# Patient Record
Sex: FEMALE | Race: OTHER | Employment: FULL TIME | URBAN - METROPOLITAN AREA
[De-identification: names, ages, dates, MRNs, and addresses within clinical notes are randomized per-mention and may not be internally consistent; named-entity substitution may affect disease eponyms.]

---

## 2017-03-07 ENCOUNTER — HOSPITAL ENCOUNTER (OUTPATIENT)
Dept: MAMMOGRAPHY | Age: 52
Discharge: HOME | End: 2017-03-07
Attending: OBSTETRICS & GYNECOLOGY
Payer: COMMERCIAL

## 2017-03-07 DIAGNOSIS — Z12.39 SCREENING FOR BREAST CANCER: ICD-10-CM

## 2017-03-07 PROCEDURE — 77067 SCR MAMMO BI INCL CAD: CPT

## 2018-03-20 ENCOUNTER — HOSPITAL ENCOUNTER (OUTPATIENT)
Dept: MAMMOGRAPHY | Age: 53
Discharge: HOME | End: 2018-03-20
Attending: OBSTETRICS & GYNECOLOGY
Payer: COMMERCIAL

## 2018-03-20 DIAGNOSIS — Z12.39 SCREENING FOR BREAST CANCER: ICD-10-CM

## 2018-03-20 PROCEDURE — 77067 SCR MAMMO BI INCL CAD: CPT

## 2019-03-23 ENCOUNTER — HOSPITAL ENCOUNTER (OUTPATIENT)
Dept: MAMMOGRAPHY | Age: 54
Discharge: HOME | End: 2019-03-23
Attending: OBSTETRICS & GYNECOLOGY
Payer: COMMERCIAL

## 2019-03-23 DIAGNOSIS — Z12.39 SCREENING FOR BREAST CANCER: ICD-10-CM

## 2019-03-23 PROCEDURE — 77067 SCR MAMMO BI INCL CAD: CPT

## 2021-09-11 ENCOUNTER — HOSPITAL ENCOUNTER (OUTPATIENT)
Dept: MAMMOGRAPHY | Age: 56
Discharge: HOME | End: 2021-09-11
Attending: OBSTETRICS & GYNECOLOGY
Payer: COMMERCIAL

## 2021-09-11 DIAGNOSIS — Z12.31 ENCOUNTER FOR SCREENING MAMMOGRAM FOR MALIGNANT NEOPLASM OF BREAST: ICD-10-CM

## 2021-09-11 DIAGNOSIS — R92.2 BREAST DENSITY: ICD-10-CM

## 2021-09-11 PROCEDURE — 77063 BREAST TOMOSYNTHESIS BI: CPT

## 2022-05-12 DIAGNOSIS — E55.9 VITAMIN D DEFICIENCY: ICD-10-CM

## 2022-05-12 DIAGNOSIS — Z00.00 ROUTINE GENERAL MEDICAL EXAMINATION AT A HEALTH CARE FACILITY: ICD-10-CM

## 2022-05-12 DIAGNOSIS — Z00.00 ROUTINE GENERAL MEDICAL EXAMINATION AT A HEALTH CARE FACILITY: Primary | ICD-10-CM

## 2022-05-12 DIAGNOSIS — E78.5 HYPERLIPIDEMIA LDL GOAL <100: Primary | ICD-10-CM

## 2022-06-27 DIAGNOSIS — E55.9 VITAMIN D DEFICIENCY: ICD-10-CM

## 2022-06-27 DIAGNOSIS — E78.5 HYPERLIPIDEMIA LDL GOAL <100: ICD-10-CM

## 2022-06-27 DIAGNOSIS — Z00.00 ROUTINE GENERAL MEDICAL EXAMINATION AT A HEALTH CARE FACILITY: ICD-10-CM

## 2022-06-27 LAB
25(OH)D3 SERPL-MCNC: 42.9 NG/ML (ref 30–100)
ALBUMIN SERPL-MCNC: 4 G/DL (ref 3.5–5)
ALBUMIN/GLOB SERPL: 1.1 {RATIO} (ref 1.2–3.5)
ALP SERPL-CCNC: 88 U/L (ref 50–136)
ALT SERPL-CCNC: 13 U/L (ref 12–65)
ANION GAP SERPL CALC-SCNC: 7 MMOL/L (ref 7–16)
APPEARANCE UR: CLEAR
AST SERPL-CCNC: 22 U/L (ref 15–37)
BASOPHILS # BLD: 0 K/UL (ref 0–0.2)
BASOPHILS NFR BLD: 1 % (ref 0–2)
BILIRUB SERPL-MCNC: 0.5 MG/DL (ref 0.2–1.1)
BILIRUB UR QL: NEGATIVE
BUN SERPL-MCNC: 10 MG/DL (ref 6–23)
CALCIUM SERPL-MCNC: 9.3 MG/DL (ref 8.3–10.4)
CHLORIDE SERPL-SCNC: 106 MMOL/L (ref 98–107)
CHOLEST SERPL-MCNC: 229 MG/DL
CO2 SERPL-SCNC: 31 MMOL/L (ref 21–32)
COLOR UR: NORMAL
CREAT SERPL-MCNC: 0.7 MG/DL (ref 0.6–1)
DIFFERENTIAL METHOD BLD: ABNORMAL
EOSINOPHIL # BLD: 0.1 K/UL (ref 0–0.8)
EOSINOPHIL NFR BLD: 3 % (ref 0.5–7.8)
ERYTHROCYTE [DISTWIDTH] IN BLOOD BY AUTOMATED COUNT: 12.4 % (ref 11.9–14.6)
GLOBULIN SER CALC-MCNC: 3.5 G/DL (ref 2.3–3.5)
GLUCOSE SERPL-MCNC: 71 MG/DL (ref 65–100)
GLUCOSE UR STRIP.AUTO-MCNC: NEGATIVE MG/DL
HCT VFR BLD AUTO: 40.3 % (ref 35.8–46.3)
HDLC SERPL-MCNC: 80 MG/DL (ref 40–60)
HDLC SERPL: 2.9 {RATIO}
HGB BLD-MCNC: 13.1 G/DL (ref 11.7–15.4)
HGB UR QL STRIP: NEGATIVE
IMM GRANULOCYTES # BLD AUTO: 0 K/UL (ref 0–0.5)
IMM GRANULOCYTES NFR BLD AUTO: 0 % (ref 0–5)
KETONES UR QL STRIP.AUTO: NEGATIVE MG/DL
LDLC SERPL CALC-MCNC: 125 MG/DL
LEUKOCYTE ESTERASE UR QL STRIP.AUTO: NEGATIVE
LYMPHOCYTES # BLD: 1.7 K/UL (ref 0.5–4.6)
LYMPHOCYTES NFR BLD: 40 % (ref 13–44)
MCH RBC QN AUTO: 30 PG (ref 26.1–32.9)
MCHC RBC AUTO-ENTMCNC: 32.5 G/DL (ref 31.4–35)
MCV RBC AUTO: 92.4 FL (ref 79.6–97.8)
MONOCYTES # BLD: 0.4 K/UL (ref 0.1–1.3)
MONOCYTES NFR BLD: 9 % (ref 4–12)
NEUTS SEG # BLD: 2 K/UL (ref 1.7–8.2)
NEUTS SEG NFR BLD: 47 % (ref 43–78)
NITRITE UR QL STRIP.AUTO: NEGATIVE
NRBC # BLD: 0 K/UL (ref 0–0.2)
PH UR STRIP: 5 [PH] (ref 5–9)
PLATELET # BLD AUTO: 157 K/UL (ref 150–450)
PMV BLD AUTO: 12.4 FL (ref 9.4–12.3)
POTASSIUM SERPL-SCNC: 3.9 MMOL/L (ref 3.5–5.1)
PROT SERPL-MCNC: 7.5 G/DL (ref 6.3–8.2)
PROT UR STRIP-MCNC: NEGATIVE MG/DL
RBC # BLD AUTO: 4.36 M/UL (ref 4.05–5.2)
SODIUM SERPL-SCNC: 144 MMOL/L (ref 136–145)
SP GR UR REFRACTOMETRY: 1.02 (ref 1–1.02)
TRIGL SERPL-MCNC: 120 MG/DL (ref 35–150)
TSH, 3RD GENERATION: 1.62 UIU/ML (ref 0.36–3.74)
UROBILINOGEN UR QL STRIP.AUTO: 0.2 EU/DL (ref 0.2–1)
VLDLC SERPL CALC-MCNC: 24 MG/DL (ref 6–23)
WBC # BLD AUTO: 4.2 K/UL (ref 4.3–11.1)

## 2022-07-06 ENCOUNTER — OFFICE VISIT (OUTPATIENT)
Dept: FAMILY MEDICINE CLINIC | Facility: CLINIC | Age: 57
End: 2022-07-06
Payer: COMMERCIAL

## 2022-07-06 VITALS
SYSTOLIC BLOOD PRESSURE: 120 MMHG | HEIGHT: 65 IN | BODY MASS INDEX: 20.99 KG/M2 | WEIGHT: 126 LBS | DIASTOLIC BLOOD PRESSURE: 80 MMHG

## 2022-07-06 DIAGNOSIS — Z00.00 ROUTINE GENERAL MEDICAL EXAMINATION AT A HEALTH CARE FACILITY: Primary | ICD-10-CM

## 2022-07-06 DIAGNOSIS — H81.13 BENIGN PAROXYSMAL POSITIONAL VERTIGO DUE TO BILATERAL VESTIBULAR DISORDER: ICD-10-CM

## 2022-07-06 DIAGNOSIS — Z12.11 SPECIAL SCREENING FOR MALIGNANT NEOPLASMS, COLON: ICD-10-CM

## 2022-07-06 DIAGNOSIS — E78.5 HYPERLIPIDEMIA LDL GOAL <100: ICD-10-CM

## 2022-07-06 DIAGNOSIS — Z13.31 SCREENING FOR DEPRESSION: ICD-10-CM

## 2022-07-06 DIAGNOSIS — I10 ESSENTIAL HYPERTENSION: ICD-10-CM

## 2022-07-06 DIAGNOSIS — E55.9 VITAMIN D DEFICIENCY: ICD-10-CM

## 2022-07-06 DIAGNOSIS — L30.9 DERMATITIS: ICD-10-CM

## 2022-07-06 PROCEDURE — 99396 PREV VISIT EST AGE 40-64: CPT | Performed by: FAMILY MEDICINE

## 2022-07-06 RX ORDER — MUPIROCIN CALCIUM 20 MG/G
CREAM TOPICAL 3 TIMES DAILY
COMMUNITY
End: 2022-07-06

## 2022-07-06 RX ORDER — MUPIROCIN CALCIUM 20 MG/G
CREAM TOPICAL
Qty: 30 G | Refills: 3 | Status: SHIPPED | OUTPATIENT
Start: 2022-07-06 | End: 2022-08-05

## 2022-07-06 RX ORDER — VALSARTAN AND HYDROCHLOROTHIAZIDE 80; 12.5 MG/1; MG/1
1 TABLET, FILM COATED ORAL DAILY
COMMUNITY
Start: 2022-05-12 | End: 2022-07-06 | Stop reason: SDUPTHER

## 2022-07-06 RX ORDER — MECLIZINE HYDROCHLORIDE 25 MG/1
25 TABLET ORAL 3 TIMES DAILY PRN
Qty: 90 TABLET | Refills: 1 | Status: SHIPPED | OUTPATIENT
Start: 2022-07-06

## 2022-07-06 RX ORDER — VALSARTAN AND HYDROCHLOROTHIAZIDE 80; 12.5 MG/1; MG/1
1 TABLET, FILM COATED ORAL DAILY
Qty: 90 TABLET | Refills: 3 | Status: SHIPPED | OUTPATIENT
Start: 2022-07-06

## 2022-07-06 ASSESSMENT — ENCOUNTER SYMPTOMS
SINUS PRESSURE: 0
NAUSEA: 1
ABDOMINAL PAIN: 0
SHORTNESS OF BREATH: 0
VOMITING: 0
COUGH: 0
SINUS PAIN: 0

## 2022-07-06 ASSESSMENT — PATIENT HEALTH QUESTIONNAIRE - PHQ9
SUM OF ALL RESPONSES TO PHQ9 QUESTIONS 1 & 2: 0
SUM OF ALL RESPONSES TO PHQ QUESTIONS 1-9: 0
1. LITTLE INTEREST OR PLEASURE IN DOING THINGS: 0
SUM OF ALL RESPONSES TO PHQ QUESTIONS 1-9: 0
SUM OF ALL RESPONSES TO PHQ QUESTIONS 1-9: 0
2. FEELING DOWN, DEPRESSED OR HOPELESS: 0
SUM OF ALL RESPONSES TO PHQ QUESTIONS 1-9: 0

## 2022-07-06 NOTE — PROGRESS NOTES
of Systems   Constitutional: Negative for chills, fatigue and fever. HENT: Negative for sinus pressure, sinus pain and tinnitus. Eyes: Negative for visual disturbance. Respiratory: Negative for cough and shortness of breath. Cardiovascular: Negative for chest pain. Gastrointestinal: Positive for nausea. Negative for abdominal pain and vomiting. Endocrine: Negative for cold intolerance and heat intolerance. Genitourinary: Negative for difficulty urinating, frequency, hematuria, pelvic pain, vaginal bleeding, vaginal discharge and vaginal pain. Neurological: Positive for dizziness. Negative for syncope, weakness, light-headedness, numbness and headaches. Psychiatric/Behavioral: Negative. OBJECTIVE:  /80 (Site: Left Upper Arm, Position: Sitting, Cuff Size: Small Adult)   Ht 5' 5\" (1.651 m)   Wt 126 lb (57.2 kg)   BMI 20.97 kg/m²      Physical Exam  Vitals and nursing note reviewed. Constitutional:       Appearance: Normal appearance. HENT:      Head: Normocephalic and atraumatic. Nose: Nose normal.      Mouth/Throat:      Mouth: Mucous membranes are moist.      Pharynx: No oropharyngeal exudate or posterior oropharyngeal erythema. Eyes:      Extraocular Movements: Extraocular movements intact. Conjunctiva/sclera: Conjunctivae normal.      Pupils: Pupils are equal, round, and reactive to light. Cardiovascular:      Rate and Rhythm: Normal rate and regular rhythm. Pulses: Normal pulses. Heart sounds: Normal heart sounds. Pulmonary:      Effort: Pulmonary effort is normal.      Breath sounds: Normal breath sounds. Abdominal:      General: Abdomen is flat. Bowel sounds are normal.      Palpations: Abdomen is soft. Genitourinary:     Comments: Deferred done by OB/GYN  Musculoskeletal:         General: Normal range of motion. Cervical back: Normal range of motion and neck supple. Skin:     General: Skin is warm.       Capillary Refill: Capillary ml/min/1.73m2    Calcium 9.3 8.3 - 10.4 MG/DL    Total Bilirubin 0.5 0.2 - 1.1 MG/DL    ALT 13 12 - 65 U/L    AST 22 15 - 37 U/L    Alk Phosphatase 88 50 - 136 U/L    Total Protein 7.5 6.3 - 8.2 g/dL    Albumin 4.0 3.5 - 5.0 g/dL    Globulin 3.5 2.3 - 3.5 g/dL    Albumin/Globulin Ratio 1.1 (L) 1.2 - 3.5     CBC with Auto Differential    Collection Time: 06/27/22  8:24 AM   Result Value Ref Range    WBC 4.2 (L) 4.3 - 11.1 K/uL    RBC 4.36 4.05 - 5.2 M/uL    Hemoglobin 13.1 11.7 - 15.4 g/dL    Hematocrit 40.3 35.8 - 46.3 %    MCV 92.4 79.6 - 97.8 FL    MCH 30.0 26.1 - 32.9 PG    MCHC 32.5 31.4 - 35.0 g/dL    RDW 12.4 11.9 - 14.6 %    Platelets 790 037 - 102 K/uL    MPV 12.4 (H) 9.4 - 12.3 FL    nRBC 0.00 0.0 - 0.2 K/uL    Differential Type AUTOMATED      Seg Neutrophils 47 43 - 78 %    Lymphocytes 40 13 - 44 %    Monocytes 9 4.0 - 12.0 %    Eosinophils % 3 0.5 - 7.8 %    Basophils 1 0.0 - 2.0 %    Immature Granulocytes 0 0.0 - 5.0 %    Segs Absolute 2.0 1.7 - 8.2 K/UL    Absolute Lymph # 1.7 0.5 - 4.6 K/UL    Absolute Mono # 0.4 0.1 - 1.3 K/UL    Absolute Eos # 0.1 0.0 - 0.8 K/UL    Basophils Absolute 0.0 0.0 - 0.2 K/UL    Absolute Immature Granulocyte 0.0 0.0 - 0.5 K/UL       ASSESSMENT and PLAN    Visit Diagnoses and Associated Orders     Routine general medical examination at a health care facility    -  Primary    HIV 1/2 Ag/Ab, 4TH Generation,W Rflx Confirm [95677 CPT(R)]   - Future Order    CBC with Auto Differential [[id number] Custom]   - Future Order    Comprehensive Metabolic Panel [[id number] Custom]   - Future Order    Lipid Panel [[id number] Custom]   - Future Order    Vitamin D 25 Hydroxy [[id number] Custom]   - Future Order    TSH [[id number] Custom]   - Future Order    Hepatitis C Antibody [[id number] Custom]   - Future Order    Urinalysis [[id number] Custom]   - Future Order         Dermatitis        mupirocin (BACTROBAN) 2 % cream [[id number]]           Essential hypertension        valsartan-hydroCHLOROthiazide (DIOVAN-HCT) 80-12.5 MG per tablet [[id number]]           Vitamin D deficiency             Hyperlipidemia LDL goal <100        Lipid Panel [[id number] Custom]   - Future Order         Screening for depression             Special screening for malignant neoplasms, colon        [facility] - Colonoscopy [[id number] Custom]           ORDERS WITHOUT AN ASSOCIATED DIAGNOSIS    vitamin D (CHOLECALCIFEROL) 25 MCG (1000 UT) TABS tablet [[id number]]               Kelsea was seen today for annual exam, discuss labs and dizziness. Diagnoses and all orders for this visit:    Routine general medical examination at a health care facility  -     HIV 1/2 Ag/Ab, 4TH Generation,W Rflx Confirm; Future  -     CBC with Auto Differential; Future  -     Comprehensive Metabolic Panel; Future  -     Lipid Panel; Future  -     Vitamin D 25 Hydroxy; Future  -     TSH; Future  -     Hepatitis C Antibody; Future  -     Urinalysis; Future    Dermatitis  -     mupirocin (BACTROBAN) 2 % cream; Apply 3 times daily. Essential hypertension  -     valsartan-hydroCHLOROthiazide (DIOVAN-HCT) 80-12.5 MG per tablet; Take 1 tablet by mouth daily    Vitamin D deficiency    Hyperlipidemia LDL goal <100  -     Cancel: Lipid Panel; Future  -     Lipid Panel;  Future    Screening for depression    Special screening for malignant neoplasms, colon  -     [facility] [place] - Colonoscopy

## 2022-11-28 ENCOUNTER — OFFICE VISIT (OUTPATIENT)
Dept: OBGYN CLINIC | Age: 57
End: 2022-11-28
Payer: COMMERCIAL

## 2022-11-28 VITALS — DIASTOLIC BLOOD PRESSURE: 80 MMHG | SYSTOLIC BLOOD PRESSURE: 122 MMHG

## 2022-11-28 DIAGNOSIS — E78.5 HYPERLIPIDEMIA LDL GOAL <100: ICD-10-CM

## 2022-11-28 DIAGNOSIS — Z12.31 ENCOUNTER FOR SCREENING MAMMOGRAM FOR MALIGNANT NEOPLASM OF BREAST: ICD-10-CM

## 2022-11-28 DIAGNOSIS — N95.2 POST-MENOPAUSAL ATROPHIC VAGINITIS: ICD-10-CM

## 2022-11-28 DIAGNOSIS — Z12.4 SCREENING FOR CERVICAL CANCER: ICD-10-CM

## 2022-11-28 DIAGNOSIS — Z01.419 WELL WOMAN EXAM WITH ROUTINE GYNECOLOGICAL EXAM: Primary | ICD-10-CM

## 2022-11-28 DIAGNOSIS — Z12.11 ENCOUNTER FOR SCREENING FECAL OCCULT BLOOD TESTING: ICD-10-CM

## 2022-11-28 LAB
CHOLEST SERPL-MCNC: 255 MG/DL
HDLC SERPL-MCNC: 78 MG/DL (ref 40–60)
HDLC SERPL: 3.3 {RATIO}
LDLC SERPL CALC-MCNC: 142.6 MG/DL
OCCULT BLOOD, OTHER: NEGATIVE
TRIGL SERPL-MCNC: 172 MG/DL (ref 35–150)
VALID INTERNAL CONTROL, POC: YES
VLDLC SERPL CALC-MCNC: 34.4 MG/DL (ref 6–23)

## 2022-11-28 PROCEDURE — 99396 PREV VISIT EST AGE 40-64: CPT | Performed by: OBSTETRICS & GYNECOLOGY

## 2022-11-28 PROCEDURE — 82271 OCCULT BLOOD OTHER SOURCES: CPT | Performed by: OBSTETRICS & GYNECOLOGY

## 2022-11-28 RX ORDER — ESTRADIOL 10 UG/1
10 INSERT VAGINAL
Qty: 36 TABLET | Refills: 4 | Status: SHIPPED | OUTPATIENT
Start: 2022-11-28

## 2022-11-28 ASSESSMENT — ENCOUNTER SYMPTOMS
ALLERGIC/IMMUNOLOGIC NEGATIVE: 1
SHORTNESS OF BREATH: 0
BLOOD IN STOOL: 0
COUGH: 0
EYES NEGATIVE: 1
RESPIRATORY NEGATIVE: 1
ABDOMINAL PAIN: 0
GASTROINTESTINAL NEGATIVE: 1

## 2022-11-28 NOTE — PROGRESS NOTES
Asa Montes is 62 y.o. female, Trevor Morris, who presents today for a routine annual gynecological examination. No LMP recorded. Patient is postmenopausal. . Has recently  about a year ago. Started dating recently, tried to have intercourse but is unable to d/t discomfort. Ow Doing well. No Gyn, Breast, G/U, or GI complaints. Has been off HRT a couple of years and doing well. Mammogram/Pap Hx 2022   Mammogram Date 2022   Mammogram Result Benign   Pap Date 2021   Pap Result Negative     GYN Intake Questionnaire 2022   Current Form of Contraception None   Dyspareunia None   Post-Coital Bleeding None   Date of Mammogram 2022   Result of Mammogram Benign   Date of Pap 2021   Pap result Negative       Contraception: postmenopausal  Has received Gardisil: UNKNOWN  Last [place] 7 years ago  Last time cholesterol was checked: had labs today with PCP    OB History:   OB History    Para Term  AB Living   1       1     SAB IAB Ectopic Molar Multiple Live Births                    # Outcome Date GA Lbr Saqib/2nd Weight Sex Delivery Anes PTL Lv   1 AB         DEC         Health Maintenance Due   Topic Date Due    COVID-19 Vaccine (1) Never done    HIV screen  Never done    DTaP/Tdap/Td vaccine (1 - Tdap) Never done    Colorectal Cancer Screen  Never done    Shingles vaccine (1 of 2) Never done    Flu vaccine (1) Never done         GYN History            Gbiran Davey  has a past medical history of Collapse of right lung, Hyperlipidemia LDL goal <100, and Vitamin D deficiency. .    Her surgeries include  has a past surgical history that includes Colonoscopy (2016) and implant breast silicone/eq (Bilateral, ). .    No Known Allergies     Her current meds are:   Current Outpatient Medications   Medication Sig    Estradiol (VAGIFEM) 10 MCG TABS vaginal tablet Place 1 tablet vaginally three times a week Use daily the first two weeks    vitamin D (CHOLECALCIFEROL) 25 MCG (1000 UT) TABS tablet Take 1,000 Units by mouth daily    valsartan-hydroCHLOROthiazide (DIOVAN-HCT) 80-12.5 MG per tablet Take 1 tablet by mouth daily    meclizine (ANTIVERT) 25 MG tablet Take 1 tablet by mouth 3 times daily as needed for Dizziness 1 p.o. at bedtime with vertigo exercises     No current facility-administered medications for this visit. Family history is significant for family history includes Cancer in her father; Diabetes in her maternal aunt and mother; Osteoporosis in her mother; Stroke in her mother. Giles Bonner  reports that she quit smoking about 21 years ago. Her smoking use included cigarettes. She has never used smokeless tobacco. She reports current alcohol use. She reports that she does not use drugs. She completed her Systems Review which is documented below. Any positive systems not related to Gyn are recommended to discuss with her PCP. Review of Systems   Constitutional: Negative. Negative for unexpected weight change. HENT: Negative. Eyes: Negative. Respiratory: Negative. Negative for cough and shortness of breath. Cardiovascular: Negative. Negative for chest pain and palpitations. Gastrointestinal: Negative. Negative for abdominal pain and blood in stool. Endocrine: Negative. Genitourinary: Negative. Negative for difficulty urinating, dysuria, menstrual problem, pelvic pain and urgency. Musculoskeletal: Negative. Skin: Negative. Allergic/Immunologic: Negative. Neurological: Negative. Hematological: Negative. Psychiatric/Behavioral: Negative. Negative for behavioral problems and confusion. All other systems reviewed and are negative. No results found for this visit on 11/28/22. Blood pressure 122/80. There is no height or weight on file to calculate BMI. Wt Readings from Last 3 Encounters:   07/06/22 126 lb (57.2 kg)   05/12/22 126 lb (57.2 kg)   08/20/21 137 lb (62.1 kg)      Physical Exam  Vitals reviewed.  Exam conducted with a chaperone present. Constitutional:       General: She is not in acute distress. Appearance: Normal appearance. HENT:      Head: Normocephalic and atraumatic. Pulmonary:      Effort: Pulmonary effort is normal. No respiratory distress. Chest:   Breasts:     Breasts are symmetrical.      Right: Normal. No bleeding, inverted nipple, mass, nipple discharge or skin change. Left: Normal. No bleeding, inverted nipple, mass, nipple discharge or skin change. Abdominal:      General: Abdomen is flat. There is no distension. Palpations: There is no mass. Tenderness: There is no abdominal tenderness. There is no guarding or rebound. Hernia: No hernia is present. There is no hernia in the left inguinal area or right inguinal area. Genitourinary:     General: Normal vulva. Exam position: Lithotomy position. Labia:         Right: No rash, tenderness, lesion or injury. Left: No rash, tenderness, lesion or injury. Vagina: Normal.      Cervix: Normal.      Uterus: Normal.       Adnexa: Right adnexa normal and left adnexa normal.        Right: No tenderness. Left: No tenderness. Rectum: Normal. Guaiac result negative. Comments: Vaginal caliber has narrowed, and changes cw atrophic vaginitis are present  Musculoskeletal:         General: Normal range of motion. Cervical back: Normal range of motion and neck supple. Lymphadenopathy:      Upper Body:      Right upper body: No axillary adenopathy. Left upper body: No axillary adenopathy. Skin:     General: Skin is warm and dry. Neurological:      General: No focal deficit present. Mental Status: She is alert and oriented to person, place, and time. Psychiatric:         Mood and Affect: Mood normal.         Behavior: Behavior normal.         Thought Content:  Thought content normal.         Judgment: Judgment normal.        Assessment/plan: Eliezer Esparza was seen today for Annual Exam       Federico Helms was seen today for annual exam.    Diagnoses and all orders for this visit:    Well woman exam with routine gynecological exam  -     PAP LB, Reflex HPV ASCUS; Future  -     ANNA JEANNIE DIGITAL SCREEN BILATERAL; Future  -     AMB POC OCCULT, OTHER SOURCE    Screening for cervical cancer  -     PAP LB, Reflex HPV ASCUS; Future    Encounter for screening mammogram for malignant neoplasm of breast  -     ANNA JEANNIE DIGITAL SCREEN BILATERAL; Future    Encounter for screening fecal occult blood testing  -     AMB POC OCCULT, OTHER SOURCE    Post-menopausal atrophic vaginitis - physiology reviewed, tx reviewed rec vagifem, would like to try.   Instruction given  -     Estradiol (VAGIFEM) 10 MCG TABS vaginal tablet; Place 1 tablet vaginally three times a week Use daily the first two weeks       Return in about 1 year (around 11/28/2023) for Annual.

## 2022-12-01 ENCOUNTER — OFFICE VISIT (OUTPATIENT)
Dept: FAMILY MEDICINE CLINIC | Facility: CLINIC | Age: 57
End: 2022-12-01
Payer: COMMERCIAL

## 2022-12-01 VITALS
BODY MASS INDEX: 22.16 KG/M2 | WEIGHT: 133 LBS | HEIGHT: 65 IN | SYSTOLIC BLOOD PRESSURE: 110 MMHG | DIASTOLIC BLOOD PRESSURE: 70 MMHG

## 2022-12-01 DIAGNOSIS — E78.5 HYPERLIPIDEMIA LDL GOAL <100: Primary | ICD-10-CM

## 2022-12-01 LAB
CYTOLOGIST CVX/VAG CYTO: NORMAL
CYTOLOGY CVX/VAG DOC THIN PREP: NORMAL
HPV REFLEX: NORMAL
Lab: NORMAL
Lab: NORMAL
PATH REPORT.FINAL DX SPEC: NORMAL
STAT OF ADQ CVX/VAG CYTO-IMP: NORMAL

## 2022-12-01 PROCEDURE — 99213 OFFICE O/P EST LOW 20 MIN: CPT | Performed by: FAMILY MEDICINE

## 2022-12-01 RX ORDER — ATORVASTATIN CALCIUM 20 MG/1
20 TABLET, FILM COATED ORAL NIGHTLY
Qty: 90 TABLET | Refills: 3 | Status: SHIPPED | OUTPATIENT
Start: 2022-12-01

## 2022-12-01 SDOH — ECONOMIC STABILITY: FOOD INSECURITY: WITHIN THE PAST 12 MONTHS, YOU WORRIED THAT YOUR FOOD WOULD RUN OUT BEFORE YOU GOT MONEY TO BUY MORE.: NEVER TRUE

## 2022-12-01 SDOH — ECONOMIC STABILITY: FOOD INSECURITY: WITHIN THE PAST 12 MONTHS, THE FOOD YOU BOUGHT JUST DIDN'T LAST AND YOU DIDN'T HAVE MONEY TO GET MORE.: NEVER TRUE

## 2022-12-01 ASSESSMENT — ENCOUNTER SYMPTOMS
SHORTNESS OF BREATH: 0
VOMITING: 0
NAUSEA: 0

## 2022-12-01 ASSESSMENT — PATIENT HEALTH QUESTIONNAIRE - PHQ9
1. LITTLE INTEREST OR PLEASURE IN DOING THINGS: 0
SUM OF ALL RESPONSES TO PHQ QUESTIONS 1-9: 0
SUM OF ALL RESPONSES TO PHQ9 QUESTIONS 1 & 2: 0
2. FEELING DOWN, DEPRESSED OR HOPELESS: 0

## 2022-12-01 ASSESSMENT — SOCIAL DETERMINANTS OF HEALTH (SDOH): HOW HARD IS IT FOR YOU TO PAY FOR THE VERY BASICS LIKE FOOD, HOUSING, MEDICAL CARE, AND HEATING?: NOT HARD AT ALL

## 2022-12-01 NOTE — PROGRESS NOTES
PROGRESS NOTE    SUBJECTIVE:   Dona Mendosa is a 62 y.o. female seen for a follow up visit regarding the following chief complaint:     Chief Complaint   Patient presents with    Cholesterol Problem     Follow up    Discuss Labs           HPI patient presents office today follow-up on lab work without any new complaints      Past Medical History, Past Surgical History, Family history, Social History, and Medications were all reviewed with the patient today and updated as necessary. Current Outpatient Medications   Medication Sig Dispense Refill    atorvastatin (LIPITOR) 20 MG tablet Take 1 tablet by mouth at bedtime 90 tablet 3    Estradiol (VAGIFEM) 10 MCG TABS vaginal tablet Place 1 tablet vaginally three times a week Use daily the first two weeks 36 tablet 4    vitamin D (CHOLECALCIFEROL) 25 MCG (1000 UT) TABS tablet Take 1,000 Units by mouth daily      valsartan-hydroCHLOROthiazide (DIOVAN-HCT) 80-12.5 MG per tablet Take 1 tablet by mouth daily 90 tablet 3     No current facility-administered medications for this visit.      No Known Allergies  Patient Active Problem List   Diagnosis    Hyperlipidemia LDL goal <100    Pneumothorax on right    Perimenopausal    Vitamin D deficiency     Past Medical History:   Diagnosis Date    Collapse of right lung 09/11/2016    Hyperlipidemia LDL goal <100 7/22/2016    Vitamin D deficiency 05/09/2014    [facility] (Level 26)     Past Surgical History:   Procedure Laterality Date    COLONOSCOPY  08/22/2016    Dr. Felicia Grey Bilateral 9839    In front of muscle/within breast tissue/ replaced 2019     Family History   Problem Relation Age of Onset    Diabetes Mother     Osteoporosis Mother     Stroke Mother     Diabetes Maternal Aunt     Cancer Father         leukemia    Breast Cancer Neg Hx     Colon Cancer Neg Hx     Ovarian Cancer Neg Hx      Social History     Tobacco Use    Smoking status: Former     Packs/day: 1.00     Years: 15.00 Pack years: 15.00     Types: Cigarettes     Start date: 36     Quit date: 2001     Years since quittin.9     Passive exposure: Never    Smokeless tobacco: Never   Substance Use Topics    Alcohol use: Yes         Review of Systems   Constitutional:  Negative for fatigue and fever. Respiratory:  Negative for shortness of breath. Cardiovascular:  Negative for chest pain. Gastrointestinal:  Negative for nausea and vomiting. OBJECTIVE:  /70 (Site: Left Upper Arm, Position: Sitting, Cuff Size: Small Adult)   Ht 5' 5\" (1.651 m)   Wt 133 lb (60.3 kg)   BMI 22.13 kg/m²      Physical Exam  Vitals and nursing note reviewed. Constitutional:       Appearance: Normal appearance. Eyes:      Pupils: Pupils are equal, round, and reactive to light. Cardiovascular:      Rate and Rhythm: Normal rate and regular rhythm. Pulmonary:      Effort: Pulmonary effort is normal.      Breath sounds: Normal breath sounds. Neurological:      Mental Status: She is alert. Psychiatric:         Mood and Affect: Mood normal.         Behavior: Behavior normal.        Medical problems and test results were reviewed with the patient today.      Recent Results (from the past 672 hour(s))   Lipid Panel    Collection Time: 22  8:41 AM   Result Value Ref Range    Cholesterol, Total 255 (H) <200 MG/DL    Triglycerides 172 (H) 35 - 150 MG/DL    HDL 78 (H) 40 - 60 MG/DL    LDL Calculated 142.6 (H) <100 MG/DL    VLDL Cholesterol Calculated 34.4 (H) 6.0 - 23.0 MG/DL    Chol/HDL Ratio 3.3     AMB POC OCCULT, OTHER SOURCE    Collection Time: 22  2:30 PM   Result Value Ref Range    Valid Internal Control, POC Yes     Occult Blood, Other Negative    PAP LB, Reflex HPV ASCUS    Collection Time: 22  2:41 PM   Result Value Ref Range    Diagnosis: Comment      Specimen adequacy: Comment      Performed by: Comment      QC reviewed by: Comment      Note: Comment      Methodology: Comment      HPV Reflex Comment ASSESSMENT and PLAN    Visit Diagnoses and Associated Orders       Hyperlipidemia LDL goal <100    -  Primary    Lipid Panel [[id number] Custom]   - Future Order    Hepatic Function Panel [[id number] Custom]   - Future Order    atorvastatin (LIPITOR) 20 MG tablet [[id number]]                       Diagnosis Orders   1. Hyperlipidemia LDL goal <100  Lipid Panel    Hepatic Function Panel    atorvastatin (LIPITOR) 20 MG tablet      , Kelsea was seen today for cholesterol problem and discuss labs. Diagnoses and all orders for this visit:    Hyperlipidemia LDL goal <100  -     Lipid Panel; Future  -     Hepatic Function Panel; Future  -     atorvastatin (LIPITOR) 20 MG tablet;  Take 1 tablet by mouth at bedtime    , Started on atorvastatin patient is diet and exercise did not work she will continue trying to best she can with diet and exercise we will start her on Lipitor 20 mg and have her return back for hepatic and lipid and we will call her back with results and plan risk benefits and options discussed

## 2022-12-03 ENCOUNTER — HOSPITAL ENCOUNTER (OUTPATIENT)
Dept: MAMMOGRAPHY | Age: 57
End: 2022-12-03
Payer: COMMERCIAL

## 2022-12-03 DIAGNOSIS — Z12.31 ENCOUNTER FOR SCREENING MAMMOGRAM FOR MALIGNANT NEOPLASM OF BREAST: ICD-10-CM

## 2022-12-03 DIAGNOSIS — Z01.419 WELL WOMAN EXAM WITH ROUTINE GYNECOLOGICAL EXAM: ICD-10-CM

## 2022-12-03 PROCEDURE — 77067 SCR MAMMO BI INCL CAD: CPT

## 2022-12-27 ENCOUNTER — CLINICAL DOCUMENTATION (OUTPATIENT)
Dept: SURGERY | Age: 57
End: 2022-12-27

## 2022-12-27 NOTE — PROGRESS NOTES
Pt has no family history of colon cancer  Pt is not taking anticoagulation  8/22/16 hyperplastic polyp colonoscopy Dr. Musa Schneider    I have reviewed the patient's chart and consider the patient an acceptable risk for screening colonoscopy without a formal office visit. We will contact the patient to give the details of the bowel prep and to schedule screening colonoscopy in the near future. Once the colonoscopy has been completed, the Health Maintenance will be updated accordingly.      CELESTE Acosta - NP

## 2022-12-29 ENCOUNTER — PREP FOR PROCEDURE (OUTPATIENT)
Dept: SURGERY | Age: 57
End: 2022-12-29

## 2022-12-29 PROBLEM — Z12.11 SPECIAL SCREENING FOR MALIGNANT NEOPLASMS, COLON: Status: ACTIVE | Noted: 2022-12-29

## 2023-01-05 ENCOUNTER — NURSE ONLY (OUTPATIENT)
Dept: FAMILY MEDICINE CLINIC | Facility: CLINIC | Age: 58
End: 2023-01-05

## 2023-01-05 DIAGNOSIS — E78.5 HYPERLIPIDEMIA LDL GOAL <100: ICD-10-CM

## 2023-01-06 LAB
ALBUMIN SERPL-MCNC: 3.8 G/DL (ref 3.5–5)
ALBUMIN/GLOB SERPL: 1.2 {RATIO} (ref 0.4–1.6)
ALP SERPL-CCNC: 77 U/L (ref 50–136)
ALT SERPL-CCNC: 22 U/L (ref 12–65)
AST SERPL-CCNC: 29 U/L (ref 15–37)
BILIRUB DIRECT SERPL-MCNC: 0.2 MG/DL
BILIRUB SERPL-MCNC: 0.9 MG/DL (ref 0.2–1.1)
CHOLEST SERPL-MCNC: 166 MG/DL
GLOBULIN SER CALC-MCNC: 3.2 G/DL (ref 2.8–4.5)
HDLC SERPL-MCNC: 74 MG/DL (ref 40–60)
HDLC SERPL: 2.2 {RATIO}
LDLC SERPL CALC-MCNC: 69.6 MG/DL
PROT SERPL-MCNC: 7 G/DL (ref 6.3–8.2)
TRIGL SERPL-MCNC: 112 MG/DL (ref 35–150)
VLDLC SERPL CALC-MCNC: 22.4 MG/DL (ref 6–23)

## 2023-01-12 ENCOUNTER — OFFICE VISIT (OUTPATIENT)
Dept: FAMILY MEDICINE CLINIC | Facility: CLINIC | Age: 58
End: 2023-01-12
Payer: COMMERCIAL

## 2023-01-12 VITALS
BODY MASS INDEX: 22.33 KG/M2 | HEIGHT: 65 IN | WEIGHT: 134 LBS | SYSTOLIC BLOOD PRESSURE: 130 MMHG | DIASTOLIC BLOOD PRESSURE: 86 MMHG

## 2023-01-12 DIAGNOSIS — I10 ESSENTIAL HYPERTENSION: ICD-10-CM

## 2023-01-12 DIAGNOSIS — E78.5 HYPERLIPIDEMIA LDL GOAL <100: Primary | ICD-10-CM

## 2023-01-12 PROCEDURE — 3075F SYST BP GE 130 - 139MM HG: CPT | Performed by: FAMILY MEDICINE

## 2023-01-12 PROCEDURE — 3079F DIAST BP 80-89 MM HG: CPT | Performed by: FAMILY MEDICINE

## 2023-01-12 PROCEDURE — 99213 OFFICE O/P EST LOW 20 MIN: CPT | Performed by: FAMILY MEDICINE

## 2023-01-12 ASSESSMENT — PATIENT HEALTH QUESTIONNAIRE - PHQ9
SUM OF ALL RESPONSES TO PHQ QUESTIONS 1-9: 0
SUM OF ALL RESPONSES TO PHQ9 QUESTIONS 1 & 2: 0
SUM OF ALL RESPONSES TO PHQ QUESTIONS 1-9: 0
2. FEELING DOWN, DEPRESSED OR HOPELESS: 0
SUM OF ALL RESPONSES TO PHQ QUESTIONS 1-9: 0
SUM OF ALL RESPONSES TO PHQ QUESTIONS 1-9: 0
1. LITTLE INTEREST OR PLEASURE IN DOING THINGS: 0

## 2023-01-12 ASSESSMENT — ENCOUNTER SYMPTOMS
NAUSEA: 0
VOMITING: 0
SHORTNESS OF BREATH: 0

## 2023-01-12 NOTE — PROGRESS NOTES
PROGRESS NOTE    SUBJECTIVE:   Richelle Leija is a 62 y.o. female seen for a follow up visit regarding the following chief complaint:     Chief Complaint   Patient presents with    Cholesterol Problem     Follow up    Gustavo ROBBISN presents to the office today for follow-up of her lab work without any new complaints states she is feeling fine      Past Medical History, Past Surgical History, Family history, Social History, and Medications were all reviewed with the patient today and updated as necessary. Current Outpatient Medications   Medication Sig Dispense Refill    atorvastatin (LIPITOR) 20 MG tablet Take 1 tablet by mouth at bedtime 90 tablet 3    vitamin D (CHOLECALCIFEROL) 25 MCG (1000 UT) TABS tablet Take 1,000 Units by mouth daily      valsartan-hydroCHLOROthiazide (DIOVAN-HCT) 80-12.5 MG per tablet Take 1 tablet by mouth daily 90 tablet 3     No current facility-administered medications for this visit.      No Known Allergies  Patient Active Problem List   Diagnosis    Hyperlipidemia LDL goal <100    Pneumothorax on right    Perimenopausal    Vitamin D deficiency    Special screening for malignant neoplasms, colon     Past Medical History:   Diagnosis Date    Collapse of right lung 09/11/2016    Hyperlipidemia LDL goal <100 7/22/2016    Vitamin D deficiency 05/09/2014    [facility] (Level 26)     Past Surgical History:   Procedure Laterality Date    COLONOSCOPY  08/22/2016    Dr. Benites Oz Bilateral 5193    In front of muscle/within breast tissue/ replaced 2019     Family History   Problem Relation Age of Onset    Diabetes Mother     Osteoporosis Mother     Stroke Mother     Diabetes Maternal Aunt     Cancer Father         leukemia    Breast Cancer Neg Hx     Colon Cancer Neg Hx     Ovarian Cancer Neg Hx      Social History     Tobacco Use    Smoking status: Former     Packs/day: 1.00     Years: 15.00     Pack years: 15.00     Types: Cigarettes     Start date: 36     Quit date: 2001     Years since quittin.0     Passive exposure: Never    Smokeless tobacco: Never   Substance Use Topics    Alcohol use: Yes         Review of Systems   Constitutional:  Negative for fatigue and fever. Respiratory:  Negative for shortness of breath. Cardiovascular:  Negative for chest pain. Gastrointestinal:  Negative for nausea and vomiting. OBJECTIVE:  /86 (Site: Left Upper Arm, Position: Sitting, Cuff Size: Small Adult)   Ht 5' 5\" (1.651 m)   Wt 134 lb (60.8 kg)   BMI 22.30 kg/m²      Physical Exam  Vitals and nursing note reviewed. Constitutional:       Appearance: Normal appearance. Eyes:      Pupils: Pupils are equal, round, and reactive to light. Cardiovascular:      Rate and Rhythm: Normal rate and regular rhythm. Pulmonary:      Effort: Pulmonary effort is normal.      Breath sounds: Normal breath sounds. Neurological:      Mental Status: She is alert. Psychiatric:         Mood and Affect: Mood normal.         Behavior: Behavior normal.        Medical problems and test results were reviewed with the patient today.      Recent Results (from the past 672 hour(s))   Hepatic Function Panel    Collection Time: 23  8:22 AM   Result Value Ref Range    Total Protein 7.0 6.3 - 8.2 g/dL    Albumin 3.8 3.5 - 5.0 g/dL    Globulin 3.2 2.8 - 4.5 g/dL    Albumin/Globulin Ratio 1.2 0.4 - 1.6      Total Bilirubin 0.9 0.2 - 1.1 MG/DL    Bilirubin, Direct 0.2 <0.4 MG/DL    Alk Phosphatase 77 50 - 136 U/L    AST 29 15 - 37 U/L    ALT 22 12 - 65 U/L   Lipid Panel    Collection Time: 23  8:22 AM   Result Value Ref Range    Cholesterol, Total 166 <200 MG/DL    Triglycerides 112 35 - 150 MG/DL    HDL 74 (H) 40 - 60 MG/DL    LDL Calculated 69.6 <100 MG/DL    VLDL Cholesterol Calculated 22.4 6.0 - 23.0 MG/DL    Chol/HDL Ratio 2.2         ASSESSMENT and PLAN    Visit Diagnoses and Associated Orders       Hyperlipidemia LDL goal <100    -  Primary Essential hypertension                         Diagnosis Orders   1. Hyperlipidemia LDL goal <100        2. Essential hypertension        , Gibran Davey was seen today for cholesterol problem and discuss labs.     Diagnoses and all orders for this visit:    Hyperlipidemia LDL goal <100    Essential hypertension    , Reviewed her labs answered all her questions counseling supportive care recommended her regular physical in the summer precautions given on statins and follow-up sooner if signs symptoms persist or worsen myalgias etc.

## 2023-01-28 PROBLEM — Z12.11 SPECIAL SCREENING FOR MALIGNANT NEOPLASMS, COLON: Status: RESOLVED | Noted: 2022-12-29 | Resolved: 2023-01-28

## 2023-02-28 ENCOUNTER — PREP FOR PROCEDURE (OUTPATIENT)
Dept: SURGERY | Age: 58
End: 2023-02-28

## 2023-02-28 RX ORDER — SODIUM CHLORIDE 0.9 % (FLUSH) 0.9 %
5-40 SYRINGE (ML) INJECTION PRN
Status: CANCELLED | OUTPATIENT
Start: 2023-02-28

## 2023-02-28 RX ORDER — SODIUM CHLORIDE 0.9 % (FLUSH) 0.9 %
5-40 SYRINGE (ML) INJECTION EVERY 12 HOURS SCHEDULED
Status: CANCELLED | OUTPATIENT
Start: 2023-02-28

## 2023-02-28 RX ORDER — SODIUM CHLORIDE 9 MG/ML
INJECTION, SOLUTION INTRAVENOUS PRN
Status: CANCELLED | OUTPATIENT
Start: 2023-02-28

## 2023-03-06 NOTE — PERIOP NOTE
Assessment completed with assistance of [facility] phone  #[phone]  Patient verified name, , and procedure. Type: 1a; abbreviated assessment per anesthesia guidelines    Labs per anesthesia: POC K+ DOS    Instructed pt that they will be notified the day before their procedure by the GI Lab for time of arrival if their procedure is [place] and Pre-op for HOSP[facility] [address]. Arrival times should be called by 5 pm. If no phone is received the patient should contact their respective hospital. The GI lab telephone number is [phone] and ES Pre-op is [phone]. Follow diet and prep instructions per office including NPO status. If patient has NOT received instructions from office patient is advised to call surgeon office, verbalizes understanding. Bath or shower the night before and the am of surgery with non-moisturizing soap. No lotions, oils, powders, cologne on skin. No make up, eye make up or jewelry. Wear loose fitting comfortable, clean clothing. Must have adult present in building the entire time . Medications for the day of procedure none, patient to hold vitamins and supplements per anesthesia guidelines. Do not take valsartan- HCTZ on the morning of procedure     The following discharge instructions reviewed with patient: medication given during procedure may cause drowsiness for several hours, therefore, do not drive or operate machinery for remainder of the day. You may not drink alcohol on the day of your procedure, please resume regular diet and activity unless otherwise directed. You may experience abdominal distention for several hours that is relieved by the passage of gas. Contact your physician if you have any of the following: fever or chills, severe abdominal pain or excessive amount of bleeding or a large amount when having a bowel movement.  Occasional specks of blood with bowel movement would not be unusual.

## 2023-03-13 ENCOUNTER — ANESTHESIA EVENT (OUTPATIENT)
Dept: ENDOSCOPY | Age: 58
End: 2023-03-13
Payer: COMMERCIAL

## 2023-03-13 RX ORDER — FENTANYL CITRATE 50 UG/ML
25 INJECTION, SOLUTION INTRAMUSCULAR; INTRAVENOUS EVERY 5 MIN PRN
Status: CANCELLED | OUTPATIENT
Start: 2023-03-13

## 2023-03-13 RX ORDER — OXYCODONE HYDROCHLORIDE 5 MG/1
5 TABLET ORAL
Status: CANCELLED | OUTPATIENT
Start: 2023-03-13 | End: 2023-03-14

## 2023-03-13 RX ORDER — SODIUM CHLORIDE, SODIUM LACTATE, POTASSIUM CHLORIDE, CALCIUM CHLORIDE 600; 310; 30; 20 MG/100ML; MG/100ML; MG/100ML; MG/100ML
INJECTION, SOLUTION INTRAVENOUS CONTINUOUS
Status: CANCELLED | OUTPATIENT
Start: 2023-03-13

## 2023-03-13 RX ORDER — DIPHENHYDRAMINE HYDROCHLORIDE 50 MG/ML
12.5 INJECTION INTRAMUSCULAR; INTRAVENOUS
Status: CANCELLED | OUTPATIENT
Start: 2023-03-13 | End: 2023-03-14

## 2023-03-13 RX ORDER — ONDANSETRON 2 MG/ML
4 INJECTION INTRAMUSCULAR; INTRAVENOUS
Status: CANCELLED | OUTPATIENT
Start: 2023-03-13 | End: 2023-03-14

## 2023-03-13 RX ORDER — HYDROMORPHONE HYDROCHLORIDE 2 MG/ML
0.5 INJECTION, SOLUTION INTRAMUSCULAR; INTRAVENOUS; SUBCUTANEOUS EVERY 10 MIN PRN
Status: CANCELLED | OUTPATIENT
Start: 2023-03-13

## 2023-03-13 RX ORDER — METOCLOPRAMIDE HYDROCHLORIDE 5 MG/ML
10 INJECTION INTRAMUSCULAR; INTRAVENOUS
Status: CANCELLED | OUTPATIENT
Start: 2023-03-13 | End: 2023-03-14

## 2023-03-13 NOTE — PROGRESS NOTES
Procedure confirmed with patient. Aware of 1100 arrival time, where to arrive and  policy. No questions at this time.

## 2023-03-14 ENCOUNTER — ANESTHESIA (OUTPATIENT)
Dept: ENDOSCOPY | Age: 58
End: 2023-03-14
Payer: COMMERCIAL

## 2023-03-14 ENCOUNTER — HOSPITAL ENCOUNTER (OUTPATIENT)
Age: 58
Setting detail: OUTPATIENT SURGERY
Discharge: HOME | End: 2023-03-14
Attending: STUDENT IN AN ORGANIZED HEALTH CARE EDUCATION/TRAINING PROGRAM | Admitting: STUDENT IN AN ORGANIZED HEALTH CARE EDUCATION/TRAINING PROGRAM
Payer: COMMERCIAL

## 2023-03-14 VITALS
SYSTOLIC BLOOD PRESSURE: 147 MMHG | RESPIRATION RATE: 16 BRPM | DIASTOLIC BLOOD PRESSURE: 61 MMHG | HEIGHT: 65 IN | OXYGEN SATURATION: 98 % | HEART RATE: 63 BPM | WEIGHT: 134 LBS | TEMPERATURE: 98 F | BODY MASS INDEX: 22.33 KG/M2

## 2023-03-14 DIAGNOSIS — Z12.11 SPECIAL SCREENING FOR MALIGNANT NEOPLASMS, COLON: ICD-10-CM

## 2023-03-14 PROCEDURE — 3609027000 HC COLONOSCOPY: Performed by: STUDENT IN AN ORGANIZED HEALTH CARE EDUCATION/TRAINING PROGRAM

## 2023-03-14 PROCEDURE — 7100000010 HC PHASE II RECOVERY - FIRST 15 MIN: Performed by: STUDENT IN AN ORGANIZED HEALTH CARE EDUCATION/TRAINING PROGRAM

## 2023-03-14 PROCEDURE — 3700000001 HC ADD 15 MINUTES (ANESTHESIA): Performed by: STUDENT IN AN ORGANIZED HEALTH CARE EDUCATION/TRAINING PROGRAM

## 2023-03-14 PROCEDURE — 7100000011 HC PHASE II RECOVERY - ADDTL 15 MIN: Performed by: STUDENT IN AN ORGANIZED HEALTH CARE EDUCATION/TRAINING PROGRAM

## 2023-03-14 PROCEDURE — 3700000000 HC ANESTHESIA ATTENDED CARE: Performed by: STUDENT IN AN ORGANIZED HEALTH CARE EDUCATION/TRAINING PROGRAM

## 2023-03-14 PROCEDURE — 6360000002 HC RX W HCPCS: Performed by: REGISTERED NURSE

## 2023-03-14 PROCEDURE — 45378 DIAGNOSTIC COLONOSCOPY: CPT | Performed by: STUDENT IN AN ORGANIZED HEALTH CARE EDUCATION/TRAINING PROGRAM

## 2023-03-14 PROCEDURE — 2580000003 HC RX 258: Performed by: ANESTHESIOLOGY

## 2023-03-14 PROCEDURE — 2709999900 HC NON-CHARGEABLE SUPPLY: Performed by: STUDENT IN AN ORGANIZED HEALTH CARE EDUCATION/TRAINING PROGRAM

## 2023-03-14 RX ORDER — SODIUM CHLORIDE 9 MG/ML
INJECTION, SOLUTION INTRAVENOUS PRN
Status: DISCONTINUED | OUTPATIENT
Start: 2023-03-14 | End: 2023-03-14 | Stop reason: HOSPADM

## 2023-03-14 RX ORDER — SODIUM CHLORIDE 0.9 % (FLUSH) 0.9 %
5-40 SYRINGE (ML) INJECTION PRN
Status: DISCONTINUED | OUTPATIENT
Start: 2023-03-14 | End: 2023-03-14 | Stop reason: HOSPADM

## 2023-03-14 RX ORDER — PROPOFOL 10 MG/ML
INJECTION, EMULSION INTRAVENOUS CONTINUOUS PRN
Status: DISCONTINUED | OUTPATIENT
Start: 2023-03-14 | End: 2023-03-14 | Stop reason: SDUPTHER

## 2023-03-14 RX ORDER — SODIUM CHLORIDE, SODIUM LACTATE, POTASSIUM CHLORIDE, CALCIUM CHLORIDE 600; 310; 30; 20 MG/100ML; MG/100ML; MG/100ML; MG/100ML
INJECTION, SOLUTION INTRAVENOUS CONTINUOUS
Status: DISCONTINUED | OUTPATIENT
Start: 2023-03-14 | End: 2023-03-14 | Stop reason: HOSPADM

## 2023-03-14 RX ORDER — PROPOFOL 10 MG/ML
INJECTION, EMULSION INTRAVENOUS PRN
Status: DISCONTINUED | OUTPATIENT
Start: 2023-03-14 | End: 2023-03-14 | Stop reason: SDUPTHER

## 2023-03-14 RX ORDER — LIDOCAINE HYDROCHLORIDE 10 MG/ML
1 INJECTION, SOLUTION INFILTRATION; PERINEURAL
Status: DISCONTINUED | OUTPATIENT
Start: 2023-03-14 | End: 2023-03-14 | Stop reason: HOSPADM

## 2023-03-14 RX ORDER — FENTANYL CITRATE 50 UG/ML
100 INJECTION, SOLUTION INTRAMUSCULAR; INTRAVENOUS
Status: DISCONTINUED | OUTPATIENT
Start: 2023-03-14 | End: 2023-03-14 | Stop reason: HOSPADM

## 2023-03-14 RX ORDER — SODIUM CHLORIDE 0.9 % (FLUSH) 0.9 %
5-40 SYRINGE (ML) INJECTION EVERY 12 HOURS SCHEDULED
Status: DISCONTINUED | OUTPATIENT
Start: 2023-03-14 | End: 2023-03-14 | Stop reason: HOSPADM

## 2023-03-14 RX ORDER — FENTANYL CITRATE 50 UG/ML
25 INJECTION, SOLUTION INTRAMUSCULAR; INTRAVENOUS
Status: DISCONTINUED | OUTPATIENT
Start: 2023-03-14 | End: 2023-03-14 | Stop reason: HOSPADM

## 2023-03-14 RX ADMIN — PROPOFOL 70 MG: 10 INJECTION, EMULSION INTRAVENOUS at 11:37

## 2023-03-14 RX ADMIN — PROPOFOL 180 MCG/KG/MIN: 10 INJECTION, EMULSION INTRAVENOUS at 11:37

## 2023-03-14 RX ADMIN — SODIUM CHLORIDE, POTASSIUM CHLORIDE, SODIUM LACTATE AND CALCIUM CHLORIDE: 600; 310; 30; 20 INJECTION, SOLUTION INTRAVENOUS at 11:26

## 2023-03-14 ASSESSMENT — PAIN - FUNCTIONAL ASSESSMENT: PAIN_FUNCTIONAL_ASSESSMENT: NONE - DENIES PAIN

## 2023-03-14 NOTE — ANESTHESIA PRE PROCEDURE
Department of Anesthesiology  Preprocedure Note       Name:  Silvestre Fall   Age:  62 y.o.  :  1965                                          MRN:  [id number]         Date:  3/14/2023      Surgeon: Austin Lopez):  Zaria Bolivar DO    Procedure: Procedure(s):  COLORECTAL CANCER SCREENING, NOT HIGH RISK    Medications prior to admission:   Prior to Admission medications    Medication Sig Start Date End Date Taking? Authorizing Provider   Multiple Vitamin (MULTIVITAMIN ADULT PO) Take by mouth   Yes Historical Provider, MD   atorvastatin (LIPITOR) 20 MG tablet Take 1 tablet by mouth at bedtime 22   Seamus Stevens DO   vitamin D (CHOLECALCIFEROL) 25 MCG (1000 UT) TABS tablet Take 1,000 Units by mouth daily    Historical Provider, MD   valsartan-hydroCHLOROthiazide (DIOVAN-HCT) 80-12.5 MG per tablet Take 1 tablet by mouth daily 22   Seamus Stevens DO       Current medications:    No current facility-administered medications for this encounter.      Current Outpatient Medications   Medication Sig Dispense Refill    Multiple Vitamin (MULTIVITAMIN ADULT PO) Take by mouth      atorvastatin (LIPITOR) 20 MG tablet Take 1 tablet by mouth at bedtime 90 tablet 3    vitamin D (CHOLECALCIFEROL) 25 MCG (1000 UT) TABS tablet Take 1,000 Units by mouth daily      valsartan-hydroCHLOROthiazide (DIOVAN-HCT) 80-12.5 MG per tablet Take 1 tablet by mouth daily 90 tablet 3       Allergies:  No Known Allergies    Problem List:    Patient Active Problem List   Diagnosis Code    Hyperlipidemia LDL goal <100 E78.5    Pneumothorax on right J93.9    Perimenopausal N95.1    Vitamin D deficiency E55.9       Past Medical History:        Diagnosis Date    Collapse of right lung 2016    Hyperlipidemia LDL goal <100 2016    Vitamin D deficiency 2014    [facility] (Level 26)       Past Surgical History:        Procedure Laterality Date    COLONOSCOPY  2016    Dr. Aishwarya Mccallum [address] Bilateral [address]    In front of muscle/within breast tissue/ replaced 2019       Social History:    Social History     Tobacco Use    Smoking status: Former     Packs/day: 1.00     Years: 15.00     Pack years: 15.00     Types: Cigarettes     Start date: 36     Quit date: 2001     Years since quittin.2     Passive exposure: Never    Smokeless tobacco: Never   Substance Use Topics    Alcohol use: Yes                                Counseling given: Not Answered      Vital Signs (Current):   Vitals:    23 1500   Weight: 134 lb (60.8 kg)   Height: 5' 5\" (1.651 m)                                              BP Readings from Last 3 Encounters:   23 130/86   22 110/70   22 122/80       NPO Status:                                                                                 BMI:   Wt Readings from Last 3 Encounters:   23 134 lb (60.8 kg)   22 133 lb (60.3 kg)   22 126 lb (57.2 kg)     Body mass index is 22.3 kg/m². CBC:   Lab Results   Component Value Date/Time    WBC 4.2 2022 08:24 AM    RBC 4.36 2022 08:24 AM    HGB 13.1 2022 08:24 AM    HCT 40.3 2022 08:24 AM    MCV 92.4 2022 08:24 AM    RDW 12.4 2022 08:24 AM     2022 08:24 AM       CMP:   Lab Results   Component Value Date/Time     2022 08:24 AM    K 3.9 2022 08:24 AM     2022 08:24 AM    CO2 31 2022 08:24 AM    BUN 10 2022 08:24 AM    CREATININE 0.70 2022 08:24 AM    GFRAA >60 2022 08:24 AM    LABGLOM >60 2022 08:24 AM    GLUCOSE 71 2022 08:24 AM    PROT 7.0 2023 08:22 AM    CALCIUM 9.3 2022 08:24 AM    BILITOT 0.9 2023 08:22 AM    ALKPHOS 77 2023 08:22 AM    AST 29 2023 08:22 AM    ALT 22 2023 08:22 AM       POC Tests: No results for input(s): POCGLU, POCNA, POCK, POCCL, POCBUN, POCHEMO, POCHCT in the last 72 hours.     Coags: No results found for: PROTIME, INR, APTT    HCG (If Applicable): No results found for: PREGTESTUR, PREGSERUM, HCG, HCGQUANT     ABGs: No results found for: PHART, PO2ART, HDS9VBG, UQS7ZLJ, BEART, D1MNAZSA     Type & Screen (If Applicable):  No results found for: LABABO, LABRH    Drug/Infectious Status (If Applicable):  Lab Results   Component Value Date/Time    HEPCAB <0.1 07/10/2017 08:18 AM       COVID-19 Screening (If Applicable): No results found for: COVID19        Anesthesia Evaluation  Patient summary reviewed and Nursing notes reviewed  Airway: Mallampati: II  TM distance: >3 FB   Neck ROM: full     Dental:          Pulmonary:Negative Pulmonary ROS and normal exam                               Cardiovascular:  Exercise tolerance: good (>4 METS),   (+) hypertension:,         Rhythm: regular  Rate: normal                    Neuro/Psych:   Negative Neuro/Psych ROS              GI/Hepatic/Renal: Neg GI/Hepatic/Renal ROS            Endo/Other: Negative Endo/Other ROS             Pt had no PAT visit       Abdominal:             Vascular: negative vascular ROS. Other Findings:           Anesthesia Plan      TIVA     ASA 2       Induction: intravenous. MIPS: Postoperative opioids intended and Prophylactic antiemetics administered. Anesthetic plan and risks discussed with patient.       Plan discussed with surgical team.                    Inna Guillory MD   3/14/2023

## 2023-03-14 NOTE — H&P
ENDOSCOPY H&P    Albertina Alaniz is a 62 y.o. female who [facility] for [facility]. Prior was 2016 unsure if it was normal. Denies FH. Patient's medications, allergies, past medical, surgical, social and family histories were reviewed and updated as appropriate. Past Medical History:   Diagnosis Date    Collapse of right lung 09/11/2016    Hyperlipidemia LDL goal <100 7/22/2016    Vitamin D deficiency 05/09/2014    [facility] (Level 26)       Past Surgical History:   Procedure Laterality Date    COLONOSCOPY  08/22/2016    Dr. Lexa Otero Bilateral 0903    In front of muscle/within breast tissue/ replaced 2019       Current Facility-Administered Medications: lidocaine 1 % injection 1 mL, 1 mL, IntraDERmal, Once PRN  fentaNYL (SUBLIMAZE) injection 25 mcg, 25 mcg, IntraVENous, Once PRN **OR** fentaNYL (SUBLIMAZE) injection 100 mcg, 100 mcg, IntraVENous, Once PRN  sodium chloride flush 0.9 % injection 5-40 mL, 5-40 mL, IntraVENous, 2 times per day  sodium chloride flush 0.9 % injection 5-40 mL, 5-40 mL, IntraVENous, PRN  0.9 % sodium chloride infusion, , IntraVENous, PRN  sodium chloride flush 0.9 % injection 5-40 mL, 5-40 mL, IntraVENous, 2 times per day  sodium chloride flush 0.9 % injection 5-40 mL, 5-40 mL, IntraVENous, PRN  0.9 % sodium chloride infusion, , IntraVENous, PRN  lactated ringers IV soln infusion, , IntraVENous, Continuous    No Known Allergies     ASA 2 - Patient with mild systemic disease with no functional limitations    (I) Normal healthy patient (II) Patient with mild systemic disease (III) Patient with severe systemic disease that limits activity but is not incapacitating (IV) Patient with an incapacitating systemic disease that is a constant threat to life (V) Moribund patient who is agonal, not expected to live 24 hrs, with or without operation; always an emergency (E).         Objective:    VITALS:   Vitals:    03/14/23 1111   BP: (!) 159/79   Pulse: 76   Resp: 16   Temp: 98.1 °F (36.7 °C)   SpO2: 100%        GENERAL: AOx3, NAD  LUNGS: unlabored breathing  HEART: RRR  ABDOMEN: Abdomen is soft, NT, ND  EXTREMITIES: Normal strength, tone and muscle mass. OMT: No TART changes present     Assessment:    screening     Plan:  the risk of the procedure including post operative pain, perforation, bleeding, infection, and the risks of anesthesia including MI, CVA, sudden death or even reaction to anesthetic medications. The patient verbalized understanding of the risks, and any and all questions were answered to the patient's satisfaction.       Radha Rios [id number] DO

## 2023-03-14 NOTE — DISCHARGE INSTRUCTIONS
Gastrointestinal Colonoscopy/Flexible Sigmoidoscopy - Lower Exam Discharge Instructions    Call Dr. Laura Razo at [phone] for any problems or questions. Contact the doctors office for follow up appointment as directed. Medication may cause drowsiness for several hours, therefore:  Do not drive or operate machinery for reminder of the day. No alcohol today. Do not make any important or legal decisions for 24 hours. Do not sign any legal documents for 24 hours. Ordinarily, you may resume regular diet and activity after exam unless otherwise specified by your physician. Because of air put into your colon during exam, you may experience some abdominal distension, relieved by the passage of gas, for several hours. Contact your physician if you have any of the following:  Excessive amount of bleeding - large amount when having a bowel movement. Occasional specks of blood with bowel movement would not be unusual.  Severe abdominal pain  Fever or Chills      Any additional instructions:    Repeat colonoscopy in 10 years.

## 2023-03-14 NOTE — ANESTHESIA POSTPROCEDURE EVALUATION
Department of Anesthesiology  Postprocedure Note    Patient: Patito rGay  MRN: [id number]  YOB: 1965  Date of evaluation: 3/14/2023      Procedure Summary     Date: 03/14/23 Room / Location: [facility] ENDO 03 / [facility] ENDOSCOPY    Anesthesia Start: 1137 Anesthesia Stop: 2803    Procedure: COLORECTAL CANCER SCREENING, NOT HIGH RISK Diagnosis:       Special screening for malignant neoplasms, colon      (Special screening for malignant neoplasms, colon [Z12.11])    Surgeons: Diego Moya DO Responsible Provider: Gómez Velazquez MD    Anesthesia Type: TIVA ASA Status: 2          Anesthesia Type: No value filed.     Belem Phase I: Belem Score: 10    Belem Phase II: Belem Score: 10      Anesthesia Post Evaluation    Patient location during evaluation: PACU  Patient participation: complete - patient participated  Level of consciousness: awake and awake and alert  Airway patency: patent  Nausea & Vomiting: no nausea  Complications: no  Cardiovascular status: hemodynamically stable  Respiratory status: acceptable  Hydration status: euvolemic  Multimodal analgesia pain management approach

## 2023-03-14 NOTE — ANESTHESIA PRE PROCEDURE
Department of Anesthesiology  Preprocedure Note       Name:  Quique Ruvalcaba   Age:  62 y.o.  :  1965                                          MRN:  [id number]         Date:  3/14/2023      Surgeon: Farrah Crews):  Ronna Phillips DO    Procedure: Procedure(s):  COLORECTAL CANCER SCREENING, NOT HIGH RISK    Medications prior to admission:   Prior to Admission medications    Medication Sig Start Date End Date Taking? Authorizing Provider   Multiple Vitamin (MULTIVITAMIN ADULT PO) Take by mouth   Yes Historical Provider, MD   atorvastatin (LIPITOR) 20 MG tablet Take 1 tablet by mouth at bedtime 22   Nomanmarcos Lambert DO   vitamin D (CHOLECALCIFEROL) 25 MCG (1000 UT) TABS tablet Take 1,000 Units by mouth daily    Historical Provider, MD   valsartan-hydroCHLOROthiazide (DIOVAN-HCT) 80-12.5 MG per tablet Take 1 tablet by mouth daily 22   Noman Lambert DO       Current medications:    No current facility-administered medications for this encounter.        Allergies:  No Known Allergies    Problem List:    Patient Active Problem List   Diagnosis Code    Hyperlipidemia LDL goal <100 E78.5    Pneumothorax on right J93.9    Perimenopausal N95.1    Vitamin D deficiency E55.9       Past Medical History:        Diagnosis Date    Collapse of right lung 2016    Hyperlipidemia LDL goal <100 2016    Vitamin D deficiency 2014    [facility] (Level 26)       Past Surgical History:        Procedure Laterality Date    COLONOSCOPY  2016    Dr. Chuy Moran Bilateral 2873    In front of muscle/within breast tissue/ replaced 2019       Social History:    Social History     Tobacco Use    Smoking status: Former     Packs/day: 1.00     Years: 15.00     Pack years: 15.00     Types: Cigarettes     Start date: 36     Quit date: 2001     Years since quittin.2     Passive exposure: Never    Smokeless tobacco: Never   Substance Use Topics    Alcohol use: Yes                                Counseling given: Not Answered      Vital Signs (Current):   Vitals:    03/06/23 1500   Weight: 134 lb (60.8 kg)   Height: 5' 5\" (1.651 m)                                              BP Readings from Last 3 Encounters:   01/12/23 130/86   12/01/22 110/70   11/28/22 122/80       NPO Status:                                                                                 BMI:   Wt Readings from Last 3 Encounters:   03/06/23 134 lb (60.8 kg)   01/12/23 134 lb (60.8 kg)   12/01/22 133 lb (60.3 kg)     Body mass index is 22.3 kg/m². CBC:   Lab Results   Component Value Date/Time    WBC 4.2 06/27/2022 08:24 AM    RBC 4.36 06/27/2022 08:24 AM    HGB 13.1 06/27/2022 08:24 AM    HCT 40.3 06/27/2022 08:24 AM    MCV 92.4 06/27/2022 08:24 AM    RDW 12.4 06/27/2022 08:24 AM     06/27/2022 08:24 AM       CMP:   Lab Results   Component Value Date/Time     06/27/2022 08:24 AM    K 3.9 06/27/2022 08:24 AM     06/27/2022 08:24 AM    CO2 31 06/27/2022 08:24 AM    BUN 10 06/27/2022 08:24 AM    CREATININE 0.70 06/27/2022 08:24 AM    GFRAA >60 06/27/2022 08:24 AM    LABGLOM >60 06/27/2022 08:24 AM    GLUCOSE 71 06/27/2022 08:24 AM    PROT 7.0 01/05/2023 08:22 AM    CALCIUM 9.3 06/27/2022 08:24 AM    BILITOT 0.9 01/05/2023 08:22 AM    ALKPHOS 77 01/05/2023 08:22 AM    AST 29 01/05/2023 08:22 AM    ALT 22 01/05/2023 08:22 AM       POC Tests: No results for input(s): POCGLU, POCNA, POCK, POCCL, POCBUN, POCHEMO, POCHCT in the last 72 hours.     Coags: No results found for: PROTIME, INR, APTT    HCG (If Applicable): No results found for: PREGTESTUR, PREGSERUM, HCG, HCGQUANT     ABGs: No results found for: PHART, PO2ART, LPL6BAL, AIS2PKM, BEART, T6LOFZUT     Type & Screen (If Applicable):  No results found for: LABABO, LABRH    Drug/Infectious Status (If Applicable):  Lab Results   Component Value Date/Time    HEPCAB <0.1 07/10/2017 08:18 AM       COVID-19 Screening (If Applicable): No results found for: COVID19        Anesthesia Evaluation  Patient summary reviewed and Nursing notes reviewed  Airway: Mallampati: II  TM distance: >3 FB   Neck ROM: full     Dental:          Pulmonary:Negative Pulmonary ROS and normal exam                               Cardiovascular:  Exercise tolerance: good (>4 METS),           Rhythm: regular  Rate: normal                    Neuro/Psych:   Negative Neuro/Psych ROS              GI/Hepatic/Renal: Neg GI/Hepatic/Renal ROS            Endo/Other: Negative Endo/Other ROS             Pt had no PAT visit       Abdominal:             Vascular: negative vascular ROS. Other Findings:           Anesthesia Plan      TIVA     ASA 2       Induction: intravenous. MIPS: Postoperative opioids intended and Prophylactic antiemetics administered. Anesthetic plan and risks discussed with patient.       Plan discussed with surgical team.                    Rochelle Khan MD   3/14/2023

## 2023-03-14 NOTE — OP NOTE
COLONOSCOPY PROCEDURE NOTE    Name:  Brayan Tidwell : 1965  Date/Time of Admission: 3/14/2023 10:55 AM   MRN: [id number] JIB:[id number] Attending Provider: Alma Teague  Room/Bed:  ENDO/PL PCP: Steven Quinones DO    Indications: The patient is being evaluated for screening    The patient was given informed consent with the indications and complications of the procedure. Consent form was signed. Procedure: Colonoscopy     Pre-operative Diagnosis: screening    Post-operative Diagnosis: normal colon    Surgeon: Arlet Bañuelos, *    Anesthesia: MAC    Procedure: Informed consent was obtained for the procedure, including sedation. Risks of perforation, hemorrhage, adverse drug reaction and aspiration were discussed. The patient was placed in the left lateral decubitus position. Based on the pre-procedure assessment, including review of the patient's medical history, medications, allergies, and review of systems, Patient had been deemed to be an appropriate candidate for conscious sedation; Patient was therefore sedated by anesthesia. The patient was monitored continuously with ECG tracing, pulse oximetry, blood pressure monitoring, and direct observations. Once adequately sedated a digital rectal exam was performed and was unremarkable. The lubricated flexible fiberoptic colonoscope was then inserted through the anus into the rectum. Under direct visualization with the use of air insufflation, the scope was advanced throughout the entire length of the colon to the cecum. The cecum was identified via visualization of the appendiceal orifice and ileocecal valve. No masses or polyps were identified. Bowel prep was good. The remainder of the ascending colon, hepatic flexure, transverse colon, splenic flexure, descending colon, and sigmoid colon showed no evidence of masses, polyps, ulcerations, erosions, strictures, diverticula, or AVMs.   The rectum was unremarkable in appearance. The endoscope was withdrawn to the anorectal verge and retroflexed revealing normal anorectal mucosal.  The endoscope was then removed from the patient. The patient tolerated procedure well without complications. Patient brought back to recovery room in satisfactory condition. RECOMMENDATIONS: 10 years    This recommendation is based on current guidelines and will prevent most but not all colon cancer cases.      Electronically Signed by: Kim Silva DO 3/14/2023     ASGE Polypectomy Surveillance Guidelines    10 years Hyperplastic Polyp(s)    7 years  1-2 Tubular Adenoma(s) (<10 mm)    Serrated Polyp (<10 mm) without Dysplasia    5 years  1-2 sessile serrated polyps    3 years  3 -10 Tubular Adenomas    One or more Tubular Adenoma(s) (>10 mm)    One or more Villous Adenoma(s)    Adenoma with High Grade Dysplasia    Sessile Serrated Polyp (>10 mm)    Sessile Serrated Polyp with Dysplasia    Traditional Serrated Adenoma    1 year  More than 10 Adenomas

## 2023-04-13 PROBLEM — Z12.11 SCREENING FOR MALIGNANT NEOPLASM OF COLON: Status: RESOLVED | Noted: 2022-12-29 | Resolved: 2023-04-13

## 2023-07-12 ENCOUNTER — NURSE ONLY (OUTPATIENT)
Dept: FAMILY MEDICINE CLINIC | Facility: CLINIC | Age: 58
End: 2023-07-12

## 2023-07-12 DIAGNOSIS — Z00.00 ROUTINE GENERAL MEDICAL EXAMINATION AT A HEALTH CARE FACILITY: ICD-10-CM

## 2023-07-13 LAB
ALBUMIN SERPL-MCNC: 3.8 G/DL (ref 3.5–5)
ALBUMIN/GLOB SERPL: 1.1 (ref 0.4–1.6)
ALP SERPL-CCNC: 86 U/L (ref 50–136)
ALT SERPL-CCNC: 19 U/L (ref 12–65)
ANION GAP SERPL CALC-SCNC: 5 MMOL/L (ref 2–11)
AST SERPL-CCNC: 27 U/L (ref 15–37)
BILIRUB SERPL-MCNC: 1.1 MG/DL (ref 0.2–1.1)
BUN SERPL-MCNC: 13 MG/DL (ref 6–23)
CALCIUM SERPL-MCNC: 9.7 MG/DL (ref 8.3–10.4)
CHLORIDE SERPL-SCNC: 104 MMOL/L (ref 101–110)
CHOLEST SERPL-MCNC: 171 MG/DL
CO2 SERPL-SCNC: 29 MMOL/L (ref 21–32)
CREAT SERPL-MCNC: 0.8 MG/DL (ref 0.6–1)
GLOBULIN SER CALC-MCNC: 3.4 G/DL (ref 2.8–4.5)
GLUCOSE SERPL-MCNC: 95 MG/DL (ref 65–100)
HDLC SERPL-MCNC: 77 MG/DL (ref 40–60)
HDLC SERPL: 2.2
LDLC SERPL CALC-MCNC: 73.6 MG/DL
POTASSIUM SERPL-SCNC: 4 MMOL/L (ref 3.5–5.1)
PROT SERPL-MCNC: 7.2 G/DL (ref 6.3–8.2)
SODIUM SERPL-SCNC: 138 MMOL/L (ref 133–143)
TRIGL SERPL-MCNC: 102 MG/DL (ref 35–150)
VLDLC SERPL CALC-MCNC: 20.4 MG/DL (ref 6–23)

## 2023-07-20 ENCOUNTER — OFFICE VISIT (OUTPATIENT)
Dept: FAMILY MEDICINE CLINIC | Facility: CLINIC | Age: 58
End: 2023-07-20
Payer: COMMERCIAL

## 2023-07-20 VITALS
SYSTOLIC BLOOD PRESSURE: 120 MMHG | BODY MASS INDEX: 22.66 KG/M2 | WEIGHT: 136 LBS | HEIGHT: 65 IN | DIASTOLIC BLOOD PRESSURE: 70 MMHG

## 2023-07-20 DIAGNOSIS — Z12.31 SCREENING MAMMOGRAM FOR HIGH-RISK PATIENT: ICD-10-CM

## 2023-07-20 DIAGNOSIS — Z00.00 ROUTINE GENERAL MEDICAL EXAMINATION AT A HEALTH CARE FACILITY: Primary | ICD-10-CM

## 2023-07-20 DIAGNOSIS — Z78.0 POSTMENOPAUSAL: ICD-10-CM

## 2023-07-20 DIAGNOSIS — Z13.31 SCREENING FOR DEPRESSION: ICD-10-CM

## 2023-07-20 DIAGNOSIS — I10 ESSENTIAL HYPERTENSION: ICD-10-CM

## 2023-07-20 DIAGNOSIS — E78.5 HYPERLIPIDEMIA LDL GOAL <100: ICD-10-CM

## 2023-07-20 LAB
25(OH)D3 SERPL-MCNC: 33.7 NG/ML (ref 30–100)
BASOPHILS # BLD: 0.1 K/UL (ref 0–0.2)
BASOPHILS NFR BLD: 1 % (ref 0–2)
BILIRUBIN, URINE, POC: NEGATIVE
BLOOD URINE, POC: NEGATIVE
DIFFERENTIAL METHOD BLD: ABNORMAL
EOSINOPHIL # BLD: 0.2 K/UL (ref 0–0.8)
EOSINOPHIL NFR BLD: 3 % (ref 0.5–7.8)
ERYTHROCYTE [DISTWIDTH] IN BLOOD BY AUTOMATED COUNT: 12.1 % (ref 11.9–14.6)
GLUCOSE URINE, POC: NEGATIVE
HCT VFR BLD AUTO: 45.2 % (ref 35.8–46.3)
HGB BLD-MCNC: 14.6 G/DL (ref 11.7–15.4)
HIV 1+2 AB+HIV1 P24 AG SERPL QL IA: NONREACTIVE
HIV 1/2 RESULT COMMENT: NORMAL
IMM GRANULOCYTES # BLD AUTO: 0 K/UL (ref 0–0.5)
IMM GRANULOCYTES NFR BLD AUTO: 1 % (ref 0–5)
KETONES, URINE, POC: NEGATIVE
LEUKOCYTE ESTERASE, URINE, POC: NEGATIVE
LYMPHOCYTES # BLD: 1.9 K/UL (ref 0.5–4.6)
LYMPHOCYTES NFR BLD: 34 % (ref 13–44)
MCH RBC QN AUTO: 30.2 PG (ref 26.1–32.9)
MCHC RBC AUTO-ENTMCNC: 32.3 G/DL (ref 31.4–35)
MCV RBC AUTO: 93.4 FL (ref 82–102)
MONOCYTES # BLD: 0.4 K/UL (ref 0.1–1.3)
MONOCYTES NFR BLD: 6 % (ref 4–12)
NEUTS SEG # BLD: 3 K/UL (ref 1.7–8.2)
NEUTS SEG NFR BLD: 55 % (ref 43–78)
NITRITE, URINE, POC: NEGATIVE
NRBC # BLD: 0 K/UL (ref 0–0.2)
PH, URINE, POC: 7 (ref 4.6–8)
PLATELET # BLD AUTO: 172 K/UL (ref 150–450)
PMV BLD AUTO: 13.1 FL (ref 9.4–12.3)
PROTEIN,URINE, POC: NEGATIVE
RBC # BLD AUTO: 4.84 M/UL (ref 4.05–5.2)
SPECIFIC GRAVITY, URINE, POC: 1.01 (ref 1–1.03)
TSH W FREE THYROID IF ABNORMAL: 1.01 UIU/ML (ref 0.36–3.74)
URINALYSIS CLARITY, POC: CLEAR
URINALYSIS COLOR, POC: YELLOW
UROBILINOGEN, POC: NORMAL
WBC # BLD AUTO: 5.5 K/UL (ref 4.3–11.1)

## 2023-07-20 PROCEDURE — 99396 PREV VISIT EST AGE 40-64: CPT | Performed by: FAMILY MEDICINE

## 2023-07-20 PROCEDURE — 3078F DIAST BP <80 MM HG: CPT | Performed by: FAMILY MEDICINE

## 2023-07-20 PROCEDURE — 3074F SYST BP LT 130 MM HG: CPT | Performed by: FAMILY MEDICINE

## 2023-07-20 PROCEDURE — 81003 URINALYSIS AUTO W/O SCOPE: CPT | Performed by: FAMILY MEDICINE

## 2023-07-20 RX ORDER — ATORVASTATIN CALCIUM 20 MG/1
20 TABLET, FILM COATED ORAL NIGHTLY
Qty: 90 TABLET | Refills: 3
Start: 2023-07-20

## 2023-07-20 RX ORDER — VALSARTAN AND HYDROCHLOROTHIAZIDE 80; 12.5 MG/1; MG/1
1 TABLET, FILM COATED ORAL DAILY
Qty: 90 TABLET | Refills: 3
Start: 2023-07-20

## 2023-07-20 SDOH — ECONOMIC STABILITY: FOOD INSECURITY: WITHIN THE PAST 12 MONTHS, THE FOOD YOU BOUGHT JUST DIDN'T LAST AND YOU DIDN'T HAVE MONEY TO GET MORE.: NEVER TRUE

## 2023-07-20 SDOH — ECONOMIC STABILITY: HOUSING INSECURITY
IN THE LAST 12 MONTHS, WAS THERE A TIME WHEN YOU DID NOT HAVE A STEADY PLACE TO SLEEP OR SLEPT IN A SHELTER (INCLUDING NOW)?: NO

## 2023-07-20 SDOH — ECONOMIC STABILITY: FOOD INSECURITY: WITHIN THE PAST 12 MONTHS, YOU WORRIED THAT YOUR FOOD WOULD RUN OUT BEFORE YOU GOT MONEY TO BUY MORE.: NEVER TRUE

## 2023-07-20 SDOH — ECONOMIC STABILITY: INCOME INSECURITY: HOW HARD IS IT FOR YOU TO PAY FOR THE VERY BASICS LIKE FOOD, HOUSING, MEDICAL CARE, AND HEATING?: NOT HARD AT ALL

## 2023-07-20 ASSESSMENT — ENCOUNTER SYMPTOMS
COUGH: 0
SHORTNESS OF BREATH: 0
ABDOMINAL PAIN: 0

## 2023-07-20 NOTE — PROGRESS NOTES
PROGRESS NOTE    SUBJECTIVE:   Charo Flaherty is a 62 y.o. female seen for a follow up visit regarding the following chief complaint:     Chief Complaint   Patient presents with    Annual Exam           HPI  Presents the office today for complete physical without complaints    Past Medical History, Past Surgical History, Family history, Social History, and Medications were all reviewed with the patient today and updated as necessary. Current Outpatient Medications   Medication Sig Dispense Refill    atorvastatin (LIPITOR) 20 MG tablet Take 1 tablet by mouth at bedtime 90 tablet 3    valsartan-hydroCHLOROthiazide (DIOVAN-HCT) 80-12.5 MG per tablet Take 1 tablet by mouth daily 90 tablet 3    Multiple Vitamin (MULTIVITAMIN ADULT PO) Take by mouth      vitamin D (CHOLECALCIFEROL) 25 MCG (1000 UT) TABS tablet Take 1 tablet by mouth daily       No current facility-administered medications for this visit.      No Known Allergies  Patient Active Problem List   Diagnosis    Hyperlipidemia LDL goal <100    Pneumothorax on right    Perimenopausal    Vitamin D deficiency     Past Medical History:   Diagnosis Date    Collapse of right lung 09/11/2016    Hyperlipidemia LDL goal <100 7/22/2016    Vitamin D deficiency 05/09/2014    [facility] (Level 26)     Past Surgical History:   Procedure Laterality Date    COLONOSCOPY  08/22/2016    Dr. Roland Bueno    COLONOSCOPY N/A 3/14/2023    COLORECTAL CANCER SCREENING, NOT HIGH RISK performed by Rudy Decker DO at [address] Bilateral [address]95    In front of muscle/within breast tissue/ replaced 2019     Family History   Problem Relation Age of Onset    Diabetes Mother     Osteoporosis Mother     Stroke Mother     Diabetes Maternal Aunt     Cancer Father         leukemia    Breast Cancer Neg Hx     Colon Cancer Neg Hx     Ovarian Cancer Neg Hx      Social History     Tobacco Use    Smoking status: Former     Packs/day: 1.00     Years: 15.00

## 2023-08-16 ENCOUNTER — HOSPITAL ENCOUNTER (OUTPATIENT)
Dept: MAMMOGRAPHY | Age: 58
Discharge: HOME | End: 2023-08-19
Attending: FAMILY MEDICINE
Payer: COMMERCIAL

## 2023-08-16 DIAGNOSIS — Z78.0 POSTMENOPAUSAL: ICD-10-CM

## 2023-08-16 PROCEDURE — 77080 DXA BONE DENSITY AXIAL: CPT

## 2024-01-04 ENCOUNTER — OFFICE VISIT (OUTPATIENT)
Dept: OBGYN CLINIC | Age: 59
End: 2024-01-04
Payer: COMMERCIAL

## 2024-01-04 VITALS — DIASTOLIC BLOOD PRESSURE: 78 MMHG | BODY MASS INDEX: 22.47 KG/M2 | SYSTOLIC BLOOD PRESSURE: 122 MMHG | WEIGHT: 135 LBS

## 2024-01-04 DIAGNOSIS — Z01.419 WELL WOMAN EXAM WITH ROUTINE GYNECOLOGICAL EXAM: Primary | ICD-10-CM

## 2024-01-04 DIAGNOSIS — Z12.31 ENCOUNTER FOR SCREENING MAMMOGRAM FOR MALIGNANT NEOPLASM OF BREAST: ICD-10-CM

## 2024-01-04 DIAGNOSIS — N95.2 POST-MENOPAUSAL ATROPHIC VAGINITIS: ICD-10-CM

## 2024-01-04 DIAGNOSIS — Z12.4 SCREENING FOR CERVICAL CANCER: ICD-10-CM

## 2024-01-04 PROCEDURE — 99396 PREV VISIT EST AGE 40-64: CPT | Performed by: OBSTETRICS & GYNECOLOGY

## 2024-01-04 RX ORDER — ESTRADIOL 10 UG/1
10 INSERT VAGINAL
Qty: 36 TABLET | Refills: 4 | Status: SHIPPED | OUTPATIENT
Start: 2024-01-05

## 2024-01-04 ASSESSMENT — ENCOUNTER SYMPTOMS
EYES NEGATIVE: 1
ALLERGIC/IMMUNOLOGIC NEGATIVE: 1
COUGH: 0
RESPIRATORY NEGATIVE: 1
ABDOMINAL PAIN: 0
BLOOD IN STOOL: 0
GASTROINTESTINAL NEGATIVE: 1
SHORTNESS OF BREATH: 0

## 2024-01-04 ASSESSMENT — PATIENT HEALTH QUESTIONNAIRE - PHQ9
SUM OF ALL RESPONSES TO PHQ QUESTIONS 1-9: 0
SUM OF ALL RESPONSES TO PHQ9 QUESTIONS 1 & 2: 0
1. LITTLE INTEREST OR PLEASURE IN DOING THINGS: 0
SUM OF ALL RESPONSES TO PHQ QUESTIONS 1-9: 0
SUM OF ALL RESPONSES TO PHQ QUESTIONS 1-9: 0
2. FEELING DOWN, DEPRESSED OR HOPELESS: 0
SUM OF ALL RESPONSES TO PHQ QUESTIONS 1-9: 0

## 2024-01-04 NOTE — PROGRESS NOTES
Neurological:      General: No focal deficit present.      Mental Status: She is alert and oriented to person, place, and time.   Psychiatric:         Mood and Affect: Mood normal.         Behavior: Behavior normal.         Thought Content: Thought content normal.         Judgment: Judgment normal.          Assessment/plan: Kelsea was seen today for Annual Exam       Kelsea was seen today for annual exam.    Diagnoses and all orders for this visit:    Well woman exam with routine gynecological exam  -     PAP LB, Reflex HPV ASCUS; Future    Screening for cervical cancer  -     PAP LB, Reflex HPV ASCUS; Future    Encounter for screening mammogram for malignant neoplasm of breast  -     [facility] JEANNIE DIGITAL SCREEN BILATERAL; Future    Post-menopausal atrophic vaginitis - rec try vagifem again, discussed instructions to use daily x 2 weeks the 3x/wk.  Wants to try. New Rx sent.  -     Estradiol (VAGIFEM) 10 MCG TABS vaginal tablet; Place 1 tablet vaginally three times a week Use daily for the first two weeks, then 3x/wk         Return in about 1 year (around 1/4/2025) for Annual.

## 2024-01-05 DIAGNOSIS — Z01.419 WELL WOMAN EXAM WITH ROUTINE GYNECOLOGICAL EXAM: ICD-10-CM

## 2024-01-05 DIAGNOSIS — Z12.4 SCREENING FOR CERVICAL CANCER: ICD-10-CM

## 2024-01-16 LAB
COLLECTION METHOD: ABNORMAL
CYTOLOGIST CVX/VAG CYTO: ABNORMAL
CYTOLOGY CVX/VAG DOC THIN PREP: ABNORMAL
HPV APTIMA: NEGATIVE
HPV REFLEX: ABNORMAL
Lab: ABNORMAL
OTHER PT INFO: ABNORMAL
PAP SOURCE: ABNORMAL
PATH REPORT.FINAL DX SPEC: ABNORMAL
PATHOLOGIST CVX/VAG CYTO: ABNORMAL
PATHOLOGIST PROVIDED ICD: ABNORMAL
PREV CYTO INFO: NEGATIVE
PREV TREATMENT RESULTS: ABNORMAL
PREV TREATMENT: ABNORMAL
RECOM F/U CVX/VAG CYTO: ABNORMAL
STAT OF ADQ CVX/VAG CYTO-IMP: ABNORMAL

## 2024-02-10 ENCOUNTER — HOSPITAL ENCOUNTER (OUTPATIENT)
Dept: MAMMOGRAPHY | Age: 59
End: 2024-02-10
Attending: OBSTETRICS & GYNECOLOGY
Payer: COMMERCIAL

## 2024-02-10 DIAGNOSIS — Z12.31 ENCOUNTER FOR SCREENING MAMMOGRAM FOR MALIGNANT NEOPLASM OF BREAST: ICD-10-CM

## 2024-02-10 PROCEDURE — 77063 BREAST TOMOSYNTHESIS BI: CPT

## 2024-06-25 DIAGNOSIS — N95.2 POST-MENOPAUSAL ATROPHIC VAGINITIS: ICD-10-CM

## 2024-06-25 RX ORDER — ESTRADIOL 10 UG/1
TABLET VAGINAL
Qty: 36 TABLET | Refills: 0 | OUTPATIENT
Start: 2024-06-25

## 2024-06-25 RX ORDER — ESTRADIOL 10 UG/1
10 INSERT VAGINAL
Qty: 36 TABLET | Refills: 4 | Status: SHIPPED | OUTPATIENT
Start: 2024-06-26

## 2024-07-29 RX ORDER — VALSARTAN AND HYDROCHLOROTHIAZIDE 160; 25 MG/1; MG/1
1 TABLET ORAL DAILY
Qty: 30 TABLET | Refills: 0 | Status: SHIPPED | OUTPATIENT
Start: 2024-07-29

## 2024-07-30 ENCOUNTER — LAB (OUTPATIENT)
Dept: FAMILY MEDICINE CLINIC | Facility: CLINIC | Age: 59
End: 2024-07-30
Payer: COMMERCIAL

## 2024-07-30 DIAGNOSIS — Z00.00 LABORATORY EXAM ORDERED AS PART OF ROUTINE GENERAL MEDICAL EXAMINATION: Primary | ICD-10-CM

## 2024-07-30 DIAGNOSIS — E55.9 VITAMIN D DEFICIENCY: ICD-10-CM

## 2024-07-30 LAB
25(OH)D3 SERPL-MCNC: 38.8 NG/ML (ref 30–100)
ALBUMIN SERPL-MCNC: 3.9 G/DL (ref 3.5–5)
ALBUMIN/GLOB SERPL: 1.2 (ref 1–1.9)
ALP SERPL-CCNC: 87 U/L (ref 35–104)
ALT SERPL-CCNC: 10 U/L (ref 12–65)
ANION GAP SERPL CALC-SCNC: 8 MMOL/L (ref 9–18)
AST SERPL-CCNC: 28 U/L (ref 15–37)
BILIRUB SERPL-MCNC: 0.7 MG/DL (ref 0–1.2)
BILIRUBIN, URINE, POC: NEGATIVE
BLOOD URINE, POC: NEGATIVE
BUN SERPL-MCNC: 16 MG/DL (ref 6–23)
CALCIUM SERPL-MCNC: 9.5 MG/DL (ref 8.8–10.2)
CHLORIDE SERPL-SCNC: 103 MMOL/L (ref 98–107)
CHOLEST SERPL-MCNC: 150 MG/DL (ref 0–200)
CO2 SERPL-SCNC: 31 MMOL/L (ref 20–28)
CREAT SERPL-MCNC: 0.85 MG/DL (ref 0.6–1.1)
GLOBULIN SER CALC-MCNC: 3.3 G/DL (ref 2.3–3.5)
GLUCOSE SERPL-MCNC: 85 MG/DL (ref 70–99)
GLUCOSE URINE, POC: NEGATIVE
GRANS ABSOLUTE, POC: 4.2 K/UL
GRANULOCYTES %, POC: 54.3 %
HDLC SERPL-MCNC: 73 MG/DL (ref 40–60)
HDLC SERPL: 2.1 (ref 0–5)
HEMATOCRIT, POC: 39.7 %
HEMOGLOBIN, POC: 13.3 G/DL
KETONES, URINE, POC: ABNORMAL
LDLC SERPL CALC-MCNC: 66 MG/DL (ref 0–100)
LEUKOCYTE ESTERASE, URINE, POC: NEGATIVE
LYMPHOCYTE %, POC: 37.9 %
LYMPHS ABSOLUTE, POC: 2.9 K/UL
MCH, POC: NORMAL PG (ref 40–?)
MCHC, POC: 33.5
MCV, POC: 94
MONOCYTE %, POC: 7.8 %
MONOCYTE, ABSOLUTE POC: 0.6 K/UL
MPV, POC: 8.7 FL
NITRITE, URINE, POC: NEGATIVE
PH, URINE, POC: 6 (ref 4.6–8)
PLATELET COUNT, POC: 190 K/UL
POTASSIUM SERPL-SCNC: 4.1 MMOL/L (ref 3.5–5.1)
PROT SERPL-MCNC: 7.1 G/DL (ref 6.3–8.2)
PROTEIN,URINE, POC: NEGATIVE
RBC, POC: 4.22 M/UL
RDW, POC: 12.1 %
SODIUM SERPL-SCNC: 142 MMOL/L (ref 136–145)
SPECIFIC GRAVITY, URINE, POC: 1.02 (ref 1–1.03)
TRIGL SERPL-MCNC: 58 MG/DL (ref 0–150)
TSH, 3RD GENERATION: 1.18 UIU/ML (ref 0.27–4.2)
URINALYSIS CLARITY, POC: CLEAR
URINALYSIS COLOR, POC: YELLOW
UROBILINOGEN, POC: NORMAL
VLDLC SERPL CALC-MCNC: 12 MG/DL (ref 6–23)
WBC, POC: 7.7 K/UL

## 2024-07-30 PROCEDURE — 85025 COMPLETE CBC W/AUTO DIFF WBC: CPT | Performed by: FAMILY MEDICINE

## 2024-07-30 PROCEDURE — 81003 URINALYSIS AUTO W/O SCOPE: CPT | Performed by: FAMILY MEDICINE

## 2024-07-30 PROCEDURE — 36415 COLL VENOUS BLD VENIPUNCTURE: CPT | Performed by: FAMILY MEDICINE

## 2024-08-05 ENCOUNTER — OFFICE VISIT (OUTPATIENT)
Dept: FAMILY MEDICINE CLINIC | Facility: CLINIC | Age: 59
End: 2024-08-05
Payer: COMMERCIAL

## 2024-08-05 VITALS
SYSTOLIC BLOOD PRESSURE: 118 MMHG | HEIGHT: 65 IN | WEIGHT: 132 LBS | OXYGEN SATURATION: 96 % | TEMPERATURE: 97.6 F | DIASTOLIC BLOOD PRESSURE: 80 MMHG | HEART RATE: 80 BPM | BODY MASS INDEX: 21.99 KG/M2

## 2024-08-05 DIAGNOSIS — R19.7 DIARRHEA, UNSPECIFIED TYPE: Primary | ICD-10-CM

## 2024-08-05 PROCEDURE — 99213 OFFICE O/P EST LOW 20 MIN: CPT | Performed by: FAMILY MEDICINE

## 2024-08-05 RX ORDER — HYOSCYAMINE SULFATE 0.125 MG
125 TABLET ORAL EVERY 6 HOURS PRN
Qty: 30 TABLET | Refills: 0 | Status: SHIPPED | OUTPATIENT
Start: 2024-08-05 | End: 2024-08-06 | Stop reason: CLARIF

## 2024-08-05 RX ORDER — PROMETHAZINE HYDROCHLORIDE 25 MG/1
25 TABLET ORAL EVERY 6 HOURS PRN
Qty: 30 TABLET | Refills: 0 | Status: SHIPPED | OUTPATIENT
Start: 2024-08-05 | End: 2024-08-06 | Stop reason: CLARIF

## 2024-08-05 RX ORDER — DIPHENOXYLATE HYDROCHLORIDE AND ATROPINE SULFATE 2.5; .025 MG/1; MG/1
1 TABLET ORAL EVERY 6 HOURS PRN
Qty: 30 TABLET | Refills: 0 | Status: SHIPPED | OUTPATIENT
Start: 2024-08-05 | End: 2024-08-06 | Stop reason: CLARIF

## 2024-08-05 SDOH — ECONOMIC STABILITY: FOOD INSECURITY: WITHIN THE PAST 12 MONTHS, THE FOOD YOU BOUGHT JUST DIDN'T LAST AND YOU DIDN'T HAVE MONEY TO GET MORE.: NEVER TRUE

## 2024-08-05 SDOH — ECONOMIC STABILITY: INCOME INSECURITY: HOW HARD IS IT FOR YOU TO PAY FOR THE VERY BASICS LIKE FOOD, HOUSING, MEDICAL CARE, AND HEATING?: NOT HARD AT ALL

## 2024-08-05 SDOH — ECONOMIC STABILITY: FOOD INSECURITY: WITHIN THE PAST 12 MONTHS, YOU WORRIED THAT YOUR FOOD WOULD RUN OUT BEFORE YOU GOT MONEY TO BUY MORE.: NEVER TRUE

## 2024-08-05 ASSESSMENT — ENCOUNTER SYMPTOMS
COUGH: 0
NAUSEA: 1
ABDOMINAL PAIN: 0
SHORTNESS OF BREATH: 0
RHINORRHEA: 0
SINUS PAIN: 0
DIARRHEA: 1
ANAL BLEEDING: 0
BLOOD IN STOOL: 0

## 2024-08-05 NOTE — PROGRESS NOTES
PROGRESS NOTE    SUBJECTIVE:   Kelsea Pride is a 59 y.o. female seen for a follow up visit regarding the following chief complaint:     Chief Complaint   Patient presents with    Diarrhea     Diarrhea for the past 3 days after eating a eggs with vegetables and hobbs denied fever , body aches, or abdominal pain           HPI patient presents office complaining of diarrhea for the past 3 days after eating certain foods but nobody else in the family that ate with her also got sick?  States it is watery diarrhea denies any fever chills or blood in the diarrhea      Past Medical History, Past Surgical History, Family history, Social History, and Medications were all reviewed with the patient today and updated as necessary.       Current Outpatient Medications   Medication Sig Dispense Refill    hyoscyamine (ANASPAZ;LEVSIN) 125 MCG tablet Take 1 tablet by mouth every 6 hours as needed for Cramping 30 tablet 0    diphenoxylate-atropine (LOMOTIL) 2.5-0.025 MG per tablet Take 1 tablet by mouth every 6 hours as needed for Diarrhea for up to 10 days. Max Daily Amount: 4 tablets 30 tablet 0    promethazine (PHENERGAN) 25 MG tablet Take 1 tablet by mouth every 6 hours as needed for Nausea 30 tablet 0    Estradiol (VAGIFEM) 10 MCG TABS vaginal tablet Place 1 tablet vaginally three times a week Use daily for the first two weeks, then 3x/wk 36 tablet 4    atorvastatin (LIPITOR) 20 MG tablet Take 1 tablet by mouth at bedtime 90 tablet 3    valsartan-hydroCHLOROthiazide (DIOVAN-HCT) 80-12.5 MG per tablet Take 1 tablet by mouth daily 90 tablet 3    Multiple Vitamin (MULTIVITAMIN ADULT PO) Take by mouth      vitamin D (CHOLECALCIFEROL) 25 MCG (1000 UT) TABS tablet Take 1 tablet by mouth daily       No current facility-administered medications for this visit.     No Known Allergies  Patient Active Problem List   Diagnosis    Hyperlipidemia LDL goal <100    Perimenopausal    Vitamin D deficiency     Past Medical History:   Diagnosis

## 2024-08-06 ENCOUNTER — OFFICE VISIT (OUTPATIENT)
Dept: FAMILY MEDICINE CLINIC | Facility: CLINIC | Age: 59
End: 2024-08-06
Payer: COMMERCIAL

## 2024-08-06 VITALS
BODY MASS INDEX: 21.83 KG/M2 | WEIGHT: 131 LBS | HEART RATE: 78 BPM | HEIGHT: 65 IN | SYSTOLIC BLOOD PRESSURE: 90 MMHG | DIASTOLIC BLOOD PRESSURE: 60 MMHG

## 2024-08-06 DIAGNOSIS — E78.5 HYPERLIPIDEMIA LDL GOAL <100: ICD-10-CM

## 2024-08-06 DIAGNOSIS — Z13.31 SCREENING FOR DEPRESSION: ICD-10-CM

## 2024-08-06 DIAGNOSIS — I10 ESSENTIAL HYPERTENSION: ICD-10-CM

## 2024-08-06 DIAGNOSIS — Z00.00 ROUTINE GENERAL MEDICAL EXAMINATION AT A HEALTH CARE FACILITY: Primary | ICD-10-CM

## 2024-08-06 PROCEDURE — 3078F DIAST BP <80 MM HG: CPT | Performed by: FAMILY MEDICINE

## 2024-08-06 PROCEDURE — 99396 PREV VISIT EST AGE 40-64: CPT | Performed by: FAMILY MEDICINE

## 2024-08-06 PROCEDURE — 3074F SYST BP LT 130 MM HG: CPT | Performed by: FAMILY MEDICINE

## 2024-08-06 RX ORDER — VALSARTAN AND HYDROCHLOROTHIAZIDE 80; 12.5 MG/1; MG/1
1 TABLET, FILM COATED ORAL DAILY
Qty: 90 TABLET | Refills: 3 | Status: SHIPPED | OUTPATIENT
Start: 2024-08-06

## 2024-08-06 RX ORDER — ATORVASTATIN CALCIUM 20 MG/1
20 TABLET, FILM COATED ORAL NIGHTLY
Qty: 90 TABLET | Refills: 3 | Status: SHIPPED | OUTPATIENT
Start: 2024-08-06

## 2024-08-06 ASSESSMENT — ENCOUNTER SYMPTOMS
COUGH: 0
ABDOMINAL PAIN: 0
SHORTNESS OF BREATH: 0

## 2024-08-06 NOTE — PROGRESS NOTES
PROGRESS NOTE    SUBJECTIVE:   Kelsea Pride is a 59 y.o. female seen for a follow up visit regarding the following chief complaint:     Chief Complaint   Patient presents with    Annual Exam           HPI patient presents to the office today for complete physical without complaints      Past Medical History, Past Surgical History, Family history, Social History, and Medications were all reviewed with the patient today and updated as necessary.       Current Outpatient Medications   Medication Sig Dispense Refill    valsartan-hydroCHLOROthiazide (DIOVAN-HCT) 80-12.5 MG per tablet Take 1 tablet by mouth daily 90 tablet 3    atorvastatin (LIPITOR) 20 MG tablet Take 1 tablet by mouth at bedtime 90 tablet 3    Estradiol (VAGIFEM) 10 MCG TABS vaginal tablet Place 1 tablet vaginally three times a week Use daily for the first two weeks, then 3x/wk 36 tablet 4    Multiple Vitamin (MULTIVITAMIN ADULT PO) Take by mouth      vitamin D (CHOLECALCIFEROL) 25 MCG (1000 UT) TABS tablet Take 1 tablet by mouth daily       No current facility-administered medications for this visit.     No Known Allergies  Patient Active Problem List   Diagnosis    Hyperlipidemia LDL goal <100    Perimenopausal    Vitamin D deficiency     Past Medical History:   Diagnosis Date    Collapse of right lung 09/11/2016    Hyperlipidemia LDL goal <100 7/22/2016    Vitamin D deficiency 05/09/2014    [facility] (Level 26)     Past Surgical History:   Procedure Laterality Date    COLONOSCOPY  08/22/2016    Dr. Reza    COLONOSCOPY N/A 3/14/2023    COLORECTAL CANCER SCREENING, NOT HIGH RISK performed by Bess Yi DO at [facility] ENDOSCOPY    IMPLANT BREAST SILICONE/EQ Bilateral 2001    In front of muscle/within breast tissue/ replaced 2019     Family History   Problem Relation Age of Onset    Diabetes Mother     Osteoporosis Mother     Stroke Mother     Diabetes Maternal Aunt     Cancer Father         leukemia    Breast Cancer Neg Hx     Colon

## 2024-08-14 ENCOUNTER — TELEPHONE (OUTPATIENT)
Dept: FAMILY MEDICINE CLINIC | Facility: CLINIC | Age: 59
End: 2024-08-14

## 2024-08-14 NOTE — TELEPHONE ENCOUNTER
Called patient no answer left a voice message to call our office regard a stool test  Dr. Rodríguez ordered on 08/05 I will call her  back tomorrow

## 2024-12-24 DIAGNOSIS — E78.5 HYPERLIPIDEMIA LDL GOAL <100: ICD-10-CM

## 2024-12-24 RX ORDER — ATORVASTATIN CALCIUM 40 MG/1
40 TABLET, FILM COATED ORAL DAILY
Qty: 90 TABLET | Refills: 0 | OUTPATIENT
Start: 2024-12-24

## 2024-12-24 RX ORDER — ATORVASTATIN CALCIUM 20 MG/1
20 TABLET, FILM COATED ORAL NIGHTLY
Qty: 90 TABLET | Refills: 3 | OUTPATIENT
Start: 2024-12-24

## 2025-01-20 ENCOUNTER — OFFICE VISIT (OUTPATIENT)
Dept: OBGYN CLINIC | Age: 60
End: 2025-01-20
Payer: COMMERCIAL

## 2025-01-20 VITALS
DIASTOLIC BLOOD PRESSURE: 72 MMHG | SYSTOLIC BLOOD PRESSURE: 120 MMHG | HEIGHT: 65 IN | WEIGHT: 137 LBS | BODY MASS INDEX: 22.82 KG/M2

## 2025-01-20 DIAGNOSIS — Z12.31 BREAST CANCER SCREENING BY MAMMOGRAM: ICD-10-CM

## 2025-01-20 DIAGNOSIS — N95.2 POST-MENOPAUSAL ATROPHIC VAGINITIS: ICD-10-CM

## 2025-01-20 DIAGNOSIS — Z01.419 WELL WOMAN EXAM WITH ROUTINE GYNECOLOGICAL EXAM: Primary | ICD-10-CM

## 2025-01-20 DIAGNOSIS — Z12.4 CERVICAL CANCER SCREENING: ICD-10-CM

## 2025-01-20 PROCEDURE — 99459 PELVIC EXAMINATION: CPT | Performed by: OBSTETRICS & GYNECOLOGY

## 2025-01-20 PROCEDURE — 99396 PREV VISIT EST AGE 40-64: CPT | Performed by: OBSTETRICS & GYNECOLOGY

## 2025-01-20 RX ORDER — ESTRADIOL 10 UG/1
10 INSERT VAGINAL
Qty: 36 TABLET | Refills: 4 | Status: SHIPPED | OUTPATIENT
Start: 2025-01-20

## 2025-01-20 SDOH — ECONOMIC STABILITY: FOOD INSECURITY: WITHIN THE PAST 12 MONTHS, YOU WORRIED THAT YOUR FOOD WOULD RUN OUT BEFORE YOU GOT MONEY TO BUY MORE.: NEVER TRUE

## 2025-01-20 SDOH — ECONOMIC STABILITY: FOOD INSECURITY: WITHIN THE PAST 12 MONTHS, THE FOOD YOU BOUGHT JUST DIDN'T LAST AND YOU DIDN'T HAVE MONEY TO GET MORE.: NEVER TRUE

## 2025-01-20 ASSESSMENT — PATIENT HEALTH QUESTIONNAIRE - PHQ9
SUM OF ALL RESPONSES TO PHQ QUESTIONS 1-9: 0
SUM OF ALL RESPONSES TO PHQ QUESTIONS 1-9: 0
SUM OF ALL RESPONSES TO PHQ9 QUESTIONS 1 & 2: 0
SUM OF ALL RESPONSES TO PHQ QUESTIONS 1-9: 0
2. FEELING DOWN, DEPRESSED OR HOPELESS: NOT AT ALL
SUM OF ALL RESPONSES TO PHQ QUESTIONS 1-9: 0
1. LITTLE INTEREST OR PLEASURE IN DOING THINGS: NOT AT ALL

## 2025-01-20 ASSESSMENT — ENCOUNTER SYMPTOMS
COUGH: 0
EYES NEGATIVE: 1
GASTROINTESTINAL NEGATIVE: 1
SHORTNESS OF BREATH: 0
ALLERGIC/IMMUNOLOGIC NEGATIVE: 1
RESPIRATORY NEGATIVE: 1
BLOOD IN STOOL: 0
ABDOMINAL PAIN: 0

## 2025-01-20 NOTE — PROGRESS NOTES
Kelsea Pride is 59 y.o. female, , who presents today for a routine annual gynecological examination.No LMP recorded. Patient is postmenopausal. . Doing well.  No Gyn, Breast, G/U, or GI complaints. Happy with the vagifem now, seems to be helping.      Date of last Mammogram: 2/10/2024   - result: Normal  Date of last Cervical Cancer screen (HPV or PAP): 2024     3:00 PM 2022     2:00 PM   Mammogram/Pap Hx   Mammogram Date 12/3/2022 2022   Mammogram Result neg Benign   Pap Date 2022   Pap Result neg Negative         2025     3:00 PM 2024     3:00 PM   GYN Intake Questionnaire   Current Form of Contraception None Other   Menarche  15   Date of Mammogram  12/3/2022   Result of Mammogram  neg   Date of Pap  2022   Pap result  neg       Contraception: postmenopausal  Has received Gardisil: NO  Last [place] : Date of last Colonoscopy: 3/14/2023 , repeat in 10 years  No cologuard on file  No FIT/FOBT on file   No flexible sigmoidoscopy on file  Last time cholesterol was checked: 1 years ago    OB History:   OB History    Para Term  AB Living   1       1     SAB IAB Ectopic Molar Multiple Live Births                    # Outcome Date GA Lbr Saqib/2nd Weight Sex Type Anes PTL Lv   1 AB         DEC         Health Maintenance Due   Topic Date Due    Hepatitis B vaccine (1 of 3 - 19+ 3-dose series) Never done    DTaP/Tdap/Td vaccine (1 - Tdap) Never done    Shingles vaccine (1 of 2) Never done    Flu vaccine (1) Never done    COVID-19 Vaccine (1 - - season) Never done    Depression Screen  2025         GYN History            Kelsea  has a past medical history of Collapse of right lung, Hyperlipidemia LDL goal <100, and Vitamin D deficiency. .    Her surgeries include  has a past surgical history that includes Colonoscopy (2016); implant breast silicone/eq (Bilateral, ); and Colonoscopy (N/A, 3/14/2023)..    No Known Allergies

## 2025-01-24 LAB
COLLECTION METHOD: NORMAL
CYTOLOGIST CVX/VAG CYTO: NORMAL
CYTOLOGY CVX/VAG DOC THIN PREP: NORMAL
DATE OF LMP: 0
HPV REFLEX: NORMAL
Lab: NORMAL
Lab: NORMAL
OTHER PT INFO: NORMAL
PAP SOURCE: NORMAL
PATH REPORT.FINAL DX SPEC: NORMAL
PREV CYTO INFO: NEGATIVE
PREV TREATMENT RESULTS: NORMAL
PREV TREATMENT: NORMAL
STAT OF ADQ CVX/VAG CYTO-IMP: NORMAL

## 2025-02-26 ENCOUNTER — HOSPITAL ENCOUNTER (OUTPATIENT)
Dept: MAMMOGRAPHY | Age: 60
Discharge: HOME | End: 2025-03-01
Attending: OBSTETRICS & GYNECOLOGY
Payer: COMMERCIAL

## 2025-02-26 DIAGNOSIS — Z12.31 BREAST CANCER SCREENING BY MAMMOGRAM: ICD-10-CM

## 2025-02-26 PROCEDURE — 77063 BREAST TOMOSYNTHESIS BI: CPT

## 2025-03-06 ENCOUNTER — OFFICE VISIT (OUTPATIENT)
Dept: OBGYN CLINIC | Age: 60
End: 2025-03-06

## 2025-03-06 VITALS
DIASTOLIC BLOOD PRESSURE: 72 MMHG | HEIGHT: 65 IN | SYSTOLIC BLOOD PRESSURE: 120 MMHG | BODY MASS INDEX: 22.66 KG/M2 | WEIGHT: 136 LBS

## 2025-03-06 DIAGNOSIS — R87.615 UNSATISFACTORY CERVICAL PAPANICOLAOU SMEAR: Primary | ICD-10-CM

## 2025-03-06 NOTE — PROGRESS NOTES
Kelsea is here for repap.  No c/o. No LMP recorded. Patient is postmenopausal.      Pelvic: NL EGBUS, vagina and cervix.  Pap done.  Kelsea was seen today for follow-up.    Diagnoses and all orders for this visit:    Unsatisfactory cervical Papanicolaou smear  -     PAP LB, Reflex HPV ASCUS; Future  -     PAP LB, Reflex HPV ASCUS       Return in about 1 year (around 3/6/2026) for Annual.

## 2025-03-11 LAB
COLLECTION METHOD: NORMAL
CYTOLOGIST CVX/VAG CYTO: NORMAL
CYTOLOGY CVX/VAG DOC THIN PREP: NORMAL
DATE OF LMP: 0
HPV REFLEX: NORMAL
Lab: NORMAL
PAP SOURCE: NORMAL
PATH REPORT.FINAL DX SPEC: NORMAL
PREV CYTO INFO: NEGATIVE
PREV TREATMENT: NORMAL
STAT OF ADQ CVX/VAG CYTO-IMP: NORMAL

## 2025-03-19 ENCOUNTER — TELEPHONE (OUTPATIENT)
Dept: OBGYN CLINIC | Age: 60
End: 2025-03-19

## 2025-03-25 ENCOUNTER — RESULTS FOLLOW-UP (OUTPATIENT)
Dept: OBGYN CLINIC | Age: 60
End: 2025-03-25

## 2025-07-29 DIAGNOSIS — E78.5 HYPERLIPIDEMIA LDL GOAL <100: ICD-10-CM

## 2025-07-29 DIAGNOSIS — I10 ESSENTIAL HYPERTENSION: ICD-10-CM

## 2025-07-29 DIAGNOSIS — E55.9 VITAMIN D DEFICIENCY: ICD-10-CM

## 2025-07-29 DIAGNOSIS — Z00.00 LABORATORY EXAM ORDERED AS PART OF ROUTINE GENERAL MEDICAL EXAMINATION: Primary | ICD-10-CM

## 2025-08-12 ENCOUNTER — LAB (OUTPATIENT)
Dept: FAMILY MEDICINE CLINIC | Facility: CLINIC | Age: 60
End: 2025-08-12
Payer: COMMERCIAL

## 2025-08-12 DIAGNOSIS — E55.9 VITAMIN D DEFICIENCY: ICD-10-CM

## 2025-08-12 DIAGNOSIS — E78.5 HYPERLIPIDEMIA LDL GOAL <100: ICD-10-CM

## 2025-08-12 DIAGNOSIS — Z00.00 LABORATORY EXAM ORDERED AS PART OF ROUTINE GENERAL MEDICAL EXAMINATION: ICD-10-CM

## 2025-08-12 LAB
25(OH)D3 SERPL-MCNC: 33.4 NG/ML (ref 30–100)
ALBUMIN SERPL-MCNC: 4 G/DL (ref 3.2–4.6)
ALBUMIN/GLOB SERPL: 1.3 (ref 1–1.9)
ALP SERPL-CCNC: 84 U/L (ref 35–104)
ALT SERPL-CCNC: 13 U/L (ref 8–45)
ANION GAP SERPL CALC-SCNC: 12 MMOL/L (ref 7–16)
AST SERPL-CCNC: 26 U/L (ref 15–37)
BILIRUB SERPL-MCNC: 0.9 MG/DL (ref 0–1.2)
BILIRUBIN, URINE, POC: NEGATIVE
BLOOD URINE, POC: NEGATIVE
BUN SERPL-MCNC: 15 MG/DL (ref 8–23)
CALCIUM SERPL-MCNC: 9.8 MG/DL (ref 8.8–10.2)
CHLORIDE SERPL-SCNC: 101 MMOL/L (ref 98–107)
CHOLEST SERPL-MCNC: 218 MG/DL (ref 0–200)
CO2 SERPL-SCNC: 27 MMOL/L (ref 20–29)
CREAT SERPL-MCNC: 0.82 MG/DL (ref 0.6–1.1)
GLOBULIN SER CALC-MCNC: 3.2 G/DL (ref 2.3–3.5)
GLUCOSE SERPL-MCNC: 77 MG/DL (ref 70–99)
GLUCOSE URINE, POC: NEGATIVE
GRANS ABSOLUTE, POC: 5.1 K/UL
GRANULOCYTES %, POC: 65.3 %
HDLC SERPL-MCNC: 74 MG/DL (ref 40–60)
HDLC SERPL: 3 (ref 0–5)
HEMATOCRIT, POC: 38.5 %
HEMOGLOBIN, POC: 12.9 G/DL
KETONES, URINE, POC: NEGATIVE
LDLC SERPL CALC-MCNC: 121 MG/DL (ref 0–100)
LEUKOCYTE ESTERASE, URINE, POC: NEGATIVE
LYMPHOCYTE %, POC: 26 %
LYMPHS ABSOLUTE, POC: 2 K/UL
MCH, POC: NORMAL PG (ref 40–?)
MCHC, POC: 33.5
MCV, POC: 94
MONOCYTE %, POC: 8.7 %
MONOCYTE, ABSOLUTE POC: 0.7 K/UL
MPV, POC: 7.3 FL
NITRITE, URINE, POC: NEGATIVE
PH, URINE, POC: 5.5 (ref 4.6–8)
PLATELET COUNT, POC: 294 K/UL
POTASSIUM SERPL-SCNC: 4 MMOL/L (ref 3.5–5.1)
PROT SERPL-MCNC: 7.2 G/DL (ref 6.3–8.2)
PROTEIN,URINE, POC: NEGATIVE
RBC, POC: 4.2 M/UL
RDW, POC: 14.3 %
SODIUM SERPL-SCNC: 140 MMOL/L (ref 136–145)
SPECIFIC GRAVITY, URINE, POC: 1.02 (ref 1–1.03)
TRIGL SERPL-MCNC: 117 MG/DL (ref 0–150)
TSH, 3RD GENERATION: 1.82 UIU/ML (ref 0.27–4.2)
URINALYSIS CLARITY, POC: CLEAR
URINALYSIS COLOR, POC: YELLOW
UROBILINOGEN, POC: NORMAL
VLDLC SERPL CALC-MCNC: 23 MG/DL (ref 6–23)
WBC, POC: 7.8 K/UL

## 2025-08-12 PROCEDURE — 36415 COLL VENOUS BLD VENIPUNCTURE: CPT | Performed by: FAMILY MEDICINE

## 2025-08-19 ENCOUNTER — OFFICE VISIT (OUTPATIENT)
Dept: FAMILY MEDICINE CLINIC | Facility: CLINIC | Age: 60
End: 2025-08-19
Payer: COMMERCIAL

## 2025-08-19 VITALS
HEART RATE: 78 BPM | HEIGHT: 65 IN | OXYGEN SATURATION: 96 % | DIASTOLIC BLOOD PRESSURE: 70 MMHG | BODY MASS INDEX: 22.82 KG/M2 | SYSTOLIC BLOOD PRESSURE: 120 MMHG | WEIGHT: 137 LBS

## 2025-08-19 DIAGNOSIS — M72.2 PLANTAR FASCIITIS: ICD-10-CM

## 2025-08-19 DIAGNOSIS — M54.50 ACUTE MIDLINE LOW BACK PAIN WITHOUT SCIATICA: ICD-10-CM

## 2025-08-19 DIAGNOSIS — E78.5 HYPERLIPIDEMIA LDL GOAL <100: ICD-10-CM

## 2025-08-19 DIAGNOSIS — I10 ESSENTIAL HYPERTENSION: ICD-10-CM

## 2025-08-19 DIAGNOSIS — Z13.31 SCREENING FOR DEPRESSION: ICD-10-CM

## 2025-08-19 DIAGNOSIS — Z00.00 ROUTINE GENERAL MEDICAL EXAMINATION AT A HEALTH CARE FACILITY: Primary | ICD-10-CM

## 2025-08-19 DIAGNOSIS — Z78.9 STATIN INTOLERANCE: ICD-10-CM

## 2025-08-19 PROCEDURE — 99396 PREV VISIT EST AGE 40-64: CPT | Performed by: FAMILY MEDICINE

## 2025-08-19 PROCEDURE — 3074F SYST BP LT 130 MM HG: CPT | Performed by: FAMILY MEDICINE

## 2025-08-19 PROCEDURE — 99213 OFFICE O/P EST LOW 20 MIN: CPT | Performed by: FAMILY MEDICINE

## 2025-08-19 PROCEDURE — 3078F DIAST BP <80 MM HG: CPT | Performed by: FAMILY MEDICINE

## 2025-08-19 RX ORDER — VALSARTAN AND HYDROCHLOROTHIAZIDE 80; 12.5 MG/1; MG/1
1 TABLET, FILM COATED ORAL DAILY
Qty: 90 TABLET | Refills: 3 | Status: SHIPPED | OUTPATIENT
Start: 2025-08-19

## 2025-08-19 RX ORDER — ATORVASTATIN CALCIUM 20 MG/1
20 TABLET, FILM COATED ORAL NIGHTLY
Qty: 90 TABLET | Refills: 3 | Status: SHIPPED | OUTPATIENT
Start: 2025-08-19

## 2025-08-19 RX ORDER — NAPROXEN 500 MG/1
500 TABLET ORAL 2 TIMES DAILY PRN
Qty: 60 TABLET | Refills: 0 | Status: SHIPPED | OUTPATIENT
Start: 2025-08-19

## 2025-08-19 ASSESSMENT — ENCOUNTER SYMPTOMS
COUGH: 0
ABDOMINAL PAIN: 0
SHORTNESS OF BREATH: 0

## (undated) DEVICE — CONNECTOR TBNG OD5-7MM O2 END DISP

## (undated) DEVICE — AIRLIFE™ OXYGEN TUBING 7 FEET (2.1 M) CRUSH RESISTANT OXYGEN TUBING, VINYL TIPPED: Brand: AIRLIFE™

## (undated) DEVICE — LUBE JELLY FOIL PACK 1.4 OZ: Brand: MEDLINE INDUSTRIES, INC.

## (undated) DEVICE — SINGLE PORT MANIFOLD: Brand: NEPTUNE 2

## (undated) DEVICE — SYRINGE, LUER SLIP, STERILE, 60ML: Brand: MEDLINE

## (undated) DEVICE — KENDALL RADIOLUCENT FOAM MONITORING ELECTRODE RECTANGULAR SHAPE: Brand: KENDALL

## (undated) DEVICE — CANNULA NSL ORAL AD FOR CAPNOFLEX CO2 O2 AIRLFE

## (undated) DEVICE — SYRINGE MED 10ML LUERLOCK TIP W/O SFTY DISP

## (undated) DEVICE — SYRINGE MED 3ML CLR PLAS STD N CTRL LUERLOCK TIP DISP

## (undated) DEVICE — YANKAUER,BULB TIP,W/O VENT,RIGID,STERILE: Brand: MEDLINE

## (undated) DEVICE — GAUZE,SPONGE,4"X4",12PLY,WOVEN,NS,LF: Brand: MEDLINE

## (undated) DEVICE — NEEDLE SYR 18GA L1.5IN RED PLAS HUB S STL BLNT FILL W/O